# Patient Record
Sex: FEMALE | Race: BLACK OR AFRICAN AMERICAN | NOT HISPANIC OR LATINO | Employment: FULL TIME | URBAN - METROPOLITAN AREA
[De-identification: names, ages, dates, MRNs, and addresses within clinical notes are randomized per-mention and may not be internally consistent; named-entity substitution may affect disease eponyms.]

---

## 2017-05-02 ENCOUNTER — OFFICE VISIT (OUTPATIENT)
Dept: OBSTETRICS AND GYNECOLOGY | Facility: CLINIC | Age: 37
End: 2017-05-02
Payer: MEDICAID

## 2017-05-02 VITALS
HEIGHT: 63 IN | SYSTOLIC BLOOD PRESSURE: 130 MMHG | DIASTOLIC BLOOD PRESSURE: 90 MMHG | BODY MASS INDEX: 41.29 KG/M2 | WEIGHT: 233 LBS

## 2017-05-02 DIAGNOSIS — G43.829 MENSTRUAL MIGRAINE WITHOUT STATUS MIGRAINOSUS, NOT INTRACTABLE: ICD-10-CM

## 2017-05-02 DIAGNOSIS — N89.8 VAGINAL DISCHARGE: Primary | ICD-10-CM

## 2017-05-02 DIAGNOSIS — Z01.419 NORMAL GYNECOLOGIC EXAMINATION: ICD-10-CM

## 2017-05-02 PROCEDURE — 99999 PR PBB SHADOW E&M-EST. PATIENT-LVL II: CPT | Mod: PBBFAC,,, | Performed by: OBSTETRICS & GYNECOLOGY

## 2017-05-02 PROCEDURE — 88175 CYTOPATH C/V AUTO FLUID REDO: CPT

## 2017-05-02 PROCEDURE — 99385 PREV VISIT NEW AGE 18-39: CPT | Mod: S$PBB,,, | Performed by: OBSTETRICS & GYNECOLOGY

## 2017-05-02 PROCEDURE — 87480 CANDIDA DNA DIR PROBE: CPT

## 2017-05-02 PROCEDURE — 99212 OFFICE O/P EST SF 10 MIN: CPT | Mod: PBBFAC | Performed by: OBSTETRICS & GYNECOLOGY

## 2017-05-02 PROCEDURE — 87591 N.GONORRHOEAE DNA AMP PROB: CPT

## 2017-05-02 RX ORDER — LEVONORGESTREL AND ETHINYL ESTRADIOL 90; 20 UG/1; UG/1
1 TABLET ORAL DAILY
Qty: 30 TABLET | Refills: 11 | Status: SHIPPED | OUTPATIENT
Start: 2017-05-02 | End: 2017-05-04

## 2017-05-02 NOTE — LETTER
May 12, 2017      Grupo Dawkins MD  [address] [place] [address]           [facility] - [facility]/ GYN  [address] [facility] [address]  [place] [address]  Phone: [phone]          Patient: Theresa Chambers   MR Number: [id number]   YOB: 1980   Date of Visit: 5/2/2017       Dear Dr. Grupo Dawkins:    Thank you for referring Theresa Chambers to me for evaluation. Attached you will find relevant portions of my assessment and plan of care.    If you have questions, please do not hesitate to call me. I look forward to following Theresa Chambers along with you.    Sincerely,    Jonathan Mora    Enclosure  CC:  No Recipients    If you would like to receive this communication electronically, please contact [email or web address] or [phone] to request more information on [employer] Link access.    For providers and/or their staff who would like to refer a patient to [facility], please contact us through our one-stop-shop provider referral line, Yoselyn Hahn, at [phone].    If you feel you have received this communication in error or would no longer like to receive these types of communications, please e-mail [email or web address]

## 2017-05-03 ENCOUNTER — TELEPHONE (OUTPATIENT)
Dept: OBSTETRICS AND GYNECOLOGY | Facility: HOSPITAL | Age: 37
End: 2017-05-03

## 2017-05-03 DIAGNOSIS — B96.89 BV (BACTERIAL VAGINOSIS): Primary | ICD-10-CM

## 2017-05-03 DIAGNOSIS — N76.0 BV (BACTERIAL VAGINOSIS): Primary | ICD-10-CM

## 2017-05-03 LAB
C TRACH DNA SPEC QL NAA+PROBE: NOT DETECTED
CANDIDA RRNA VAG QL PROBE: NEGATIVE
G VAGINALIS RRNA GENITAL QL PROBE: POSITIVE
N GONORRHOEA DNA SPEC QL NAA+PROBE: NOT DETECTED
T VAGINALIS RRNA GENITAL QL PROBE: NEGATIVE

## 2017-05-03 RX ORDER — METRONIDAZOLE 500 MG/1
500 TABLET ORAL EVERY 12 HOURS
Qty: 14 TABLET | Refills: 0 | Status: SHIPPED | OUTPATIENT
Start: 2017-05-03 | End: 2017-05-17

## 2017-05-04 ENCOUNTER — TELEPHONE (OUTPATIENT)
Dept: OBSTETRICS AND GYNECOLOGY | Facility: HOSPITAL | Age: 37
End: 2017-05-04

## 2017-05-04 ENCOUNTER — TELEPHONE (OUTPATIENT)
Dept: OBSTETRICS AND GYNECOLOGY | Facility: CLINIC | Age: 37
End: 2017-05-04

## 2017-05-04 DIAGNOSIS — G43.829 MENSTRUAL MIGRAINE WITHOUT STATUS MIGRAINOSUS, NOT INTRACTABLE: Primary | ICD-10-CM

## 2017-05-04 RX ORDER — LEVONORGESTREL AND ETHINYL ESTRADIOL 0.15-0.03
1 KIT ORAL DAILY
Qty: 91 TABLET | Refills: 0 | Status: SHIPPED | OUTPATIENT
Start: 2017-05-04 | End: 2018-05-04

## 2017-05-04 NOTE — TELEPHONE ENCOUNTER
----- Message from Mareneusebio Barros sent at 5/4/2017  1:14 PM CDT -----  Contact: [facility] [place]  Request an alternation on levonorgestrel-ethinyl estrad (LYBREL) 90-20 mcg per tablet. Medicaid won't cover this medication. Thanks!

## 2017-05-04 NOTE — TELEPHONE ENCOUNTER
Notes Recorded by Kendal Peña MA on 5/4/2017 at 9:12 AM  Called patient no answer, unable to leave voicemail

## 2017-05-04 NOTE — TELEPHONE ENCOUNTER
----- Message from Rubén Wood MD sent at 5/3/2017 10:07 PM CDT -----  Call pt and tell her she has BV, med has been sent to pharm.

## 2017-05-05 NOTE — PROGRESS NOTES
Subjective:       Patient ID: Theresa Chambers is a 36 y.o. female.    Chief Complaint:  Gynecologic Exam      History of Present Illness  HPI  Annual Exam-Premenopausal  Patient presents for annual exam. The patient has complaints of vaginal discharge today. The patient is sexually active. GYN screening history: last pap: was normal and patient does not recall when last pap was. The patient wears seatbelts: yes. The patient participates in regular exercise: no. Has the patient ever been transfused or tattooed?: yes. The patient reports that there is not domestic violence in her life.    Pt states HA's at time of menses; pt has tubal for contraception.                  GYN & OB History  Patient's last menstrual period was 2017 (exact date).   Date of Last Pap: 2017    OB History    Para Term  AB SAB TAB Ectopic Multiple Living   4 4       1       # Outcome Date GA Lbr Ryan/2nd Weight Sex Delivery Anes PTL Lv   4A Para 05     CS-LTranv      4B Para 05     CS-LTranv      3 Para 00     Vag-Spont      2 Para 00     Vag-Spont      1 Para                   Review of Systems  Review of Systems   Constitutional: Negative.    Respiratory: Negative.    Cardiovascular: Negative.    Gastrointestinal: Negative.    Endocrine: Negative.    Genitourinary: Negative.    Musculoskeletal: Negative.    Neurological: Positive for headaches. Negative for seizures, syncope and numbness.   Hematological: Negative.    Psychiatric/Behavioral: Negative.    Breast: negative.            Objective:    Physical Exam:   Constitutional: She is oriented to person, place, and time. She appears well-developed and well-nourished. No distress.    HENT:   Head: Normocephalic and atraumatic.     Neck: No thyromegaly present.    Cardiovascular: Normal rate.     Pulmonary/Chest: Effort normal. No respiratory distress. Right breast exhibits no inverted nipple, no mass, no nipple discharge, no skin change, no  tenderness, presence, no bleeding and no swelling. Left breast exhibits no inverted nipple, no mass, no nipple discharge, no skin change, no tenderness, presence, no bleeding and no swelling.        Abdominal: Soft. Bowel sounds are normal.     Genitourinary: Uterus normal. Pelvic exam was performed with patient supine. There is no rash, tenderness, lesion or injury on the right labia. There is no rash, tenderness, lesion or injury on the left labia. Cervix is normal. Right adnexum displays no mass, no tenderness and no fullness. Left adnexum displays no mass, no tenderness and no fullness. No erythema, tenderness, bleeding, rectocele, cystocele or unspecified prolapse of vaginal walls in the vagina. No foreign body in the vagina. No signs of injury around the vagina. Vaginal discharge found. Labial bartholins normal.          Musculoskeletal: Normal range of motion and moves all extremeties.       Neurological: She is alert and oriented to person, place, and time. No cranial nerve deficit. Coordination normal.    Skin: She is not diaphoretic.    Psychiatric: She has a normal mood and affect. Her behavior is normal. Judgment and thought content normal.          Assessment:        1. Vaginal discharge    2. Normal gynecologic examination    3. Menstrual migraine without status migrainosus, not intractable               Plan:      1.  Pap collected  2.  Rx Lybrel to see if HA's improve  3.  AFFIRM, GC/Ct collected

## 2017-05-08 ENCOUNTER — TELEPHONE (OUTPATIENT)
Dept: OBSTETRICS AND GYNECOLOGY | Facility: CLINIC | Age: 37
End: 2017-05-08

## 2017-05-08 NOTE — TELEPHONE ENCOUNTER
----- Message from Mel Glynn sent at 5/8/2017 10:22 AM CDT -----  Contact: self  Pt returned Kendal's call. Please contact her at [phone].    Thanks     Pt aware of normal pap smear results.

## 2017-07-21 ENCOUNTER — OFFICE VISIT (OUTPATIENT)
Dept: OCCUPATIONAL MEDICINE | Facility: CLINIC | Age: 37
End: 2017-07-21
Payer: OTHER MISCELLANEOUS

## 2017-07-21 VITALS — SYSTOLIC BLOOD PRESSURE: 124 MMHG | DIASTOLIC BLOOD PRESSURE: 83 MMHG | HEART RATE: 81 BPM

## 2017-07-21 DIAGNOSIS — S39.012D LUMBAR SPINE STRAIN, SUBSEQUENT ENCOUNTER: Primary | ICD-10-CM

## 2017-07-21 DIAGNOSIS — M54.50 ACUTE BILATERAL LOW BACK PAIN WITHOUT SCIATICA: ICD-10-CM

## 2017-07-21 PROBLEM — S39.012A LUMBAR SPINE STRAIN: Status: ACTIVE | Noted: 2017-06-21

## 2017-07-21 PROCEDURE — 99203 OFFICE O/P NEW LOW 30 MIN: CPT | Mod: S$GLB,,, | Performed by: NURSE PRACTITIONER

## 2017-07-21 NOTE — LETTER
[facility] [address]  [place] [address]  Phone: [phone]  Fax: [phone]    Pt Name: Theresa Chambers  Injury Date:    Employee ID:  Date of First Treatment: 07/25/2017   Company: [employer] to [employer]            Appointment Time: 08:45 AM Arrived:  9:00 AM CDT   Physician: Obinna Castle NP          Chief Complaint   Patient presents with    Work Related Injury    Back Injury       Office Treatment: Theresa was seen today for work related injury and back injury.    Diagnoses and all orders for this visit:    Lumbar spine strain, subsequent encounter    Acute bilateral low back pain without sciatica            Restrictions: No lifting/pushing/pulling more than 10 lbs, No above the shoulder/overhead work, Avoid frequent bending/lifting/twisting, Avoid climbing/kneeling/squatting       Return Appointment: 7/28/2017

## 2017-07-21 NOTE — LETTER
[facility] [address]  [place] [address]  Phone: [phone]  Fax: [phone]    Pt Name: Theresa Chambers  Injury Date:    Employee ID:  Date of First Treatment: 07/21/2017   Company: [employer] to [employer]            Appointment Time: 08:45 AM Arrived:  9:00 AM CDT   Physician: Obinna Castle NP            Office Treatment: Theresa was seen today for work related injury and back injury.    Diagnoses and all orders for this visit:    Lumbar spine strain, subsequent encounter    Acute bilateral low back pain without sciatica            Restrictions: No lifting/pushing/pulling more than 10 lbs, No above the shoulder/overhead work, Avoid frequent bending/lifting/twisting, Avoid climbing/kneeling/squatting       Return Appointment: 7/28/2017

## 2017-07-21 NOTE — PROGRESS NOTES
Subjective:       Patient ID: Theresa Chambers is a 36 y.o. female.    Chief Complaint: Work Related Injury and Back Injury    Patient presents today for f/u s/p work related lower back injury.  Currently attending PT and reports she is doing well and feeling much better.  Reports medication prescribed has been working and she is able to move more freely with home exercise and Pt.  Denies any bowel and or bladder changes.  Denies pain radiating.        Back Pain   This is a recurrent problem. The current episode started more than 1 month ago. The problem occurs intermittently. The problem has been gradually improving since onset. The pain is present in the lumbar spine. The quality of the pain is described as aching. The pain does not radiate. The pain is at a severity of 4/10. The pain is mild. The symptoms are aggravated by bending and position. Stiffness is present all day. Pertinent negatives include no abdominal pain, bladder incontinence, bowel incontinence, chest pain, dysuria, fever, headaches, leg pain, numbness, paresis, paresthesias, pelvic pain, tingling, weakness or weight loss. Risk factors include lack of exercise, obesity, recent trauma and sedentary lifestyle. She has tried NSAIDs, muscle relaxant, ice, heat and home exercises (Patient is currenting attending PT and reports she is doing much better) for the symptoms. The treatment provided moderate relief.     Review of Systems   Constitution: Negative for decreased appetite, fever, weakness, malaise/fatigue and weight loss.   HENT: Negative for congestion and headaches.    Eyes: Negative for blurred vision, double vision and visual disturbance.   Cardiovascular: Negative for chest pain, irregular heartbeat, palpitations and syncope.   Respiratory: Negative for cough and shortness of breath.    Endocrine: Negative for polydipsia, polyphagia and polyuria.   Hematologic/Lymphatic: Negative for bleeding problem. Does not bruise/bleed easily.   Skin:  Negative for itching, nail changes and rash.   Musculoskeletal: Positive for back pain and stiffness. Negative for arthritis, joint pain, muscle cramps and neck pain.        Pt states that she has some Lower back pain but more so aching from physical therapy.   Gastrointestinal: Negative for abdominal pain, bowel incontinence, constipation, diarrhea and heartburn.   Genitourinary: Negative for bladder incontinence, dysuria, flank pain, hematuria, incomplete emptying, menorrhagia, missed menses, non-menstrual bleeding and pelvic pain.   Neurological: Negative for disturbances in coordination, dizziness, numbness, paresthesias, sensory change and tingling.   Psychiatric/Behavioral: Negative for altered mental status and depression.   Allergic/Immunologic: Negative for environmental allergies and persistent infections.       Objective:      Physical Exam   Constitutional: She is oriented to person, place, and time. Vital signs are normal. She appears well-developed and well-nourished. She is active and cooperative. No distress.   HENT:   Head: Normocephalic and atraumatic.   Mouth/Throat: Mucous membranes are normal.   Eyes: EOM and lids are normal. Pupils are equal, round, and reactive to light.   Neck: Trachea normal, normal range of motion, full passive range of motion without pain and phonation normal. Neck supple.   Cardiovascular: Normal rate, regular rhythm and normal pulses.    Pulmonary/Chest: Effort normal and breath sounds normal.   Abdominal: Soft. Normal appearance and bowel sounds are normal. She exhibits no abdominal bruit and no pulsatile midline mass.   Musculoskeletal: She exhibits no edema or deformity. Tenderness: tenderness lumbar paraspinal.  no spasms appreciated.        Right hip: Normal.        Left hip: Normal.        Thoracic back: Normal.        Lumbar back: She exhibits decreased range of motion, tenderness and pain. She exhibits no swelling and no spasm.        Back:         Right upper  leg: Normal.        Left upper leg: Normal.   Neurological: She is alert and oriented to person, place, and time. She has normal strength and normal reflexes. She displays normal reflexes. No cranial nerve deficit or sensory deficit. She exhibits normal muscle tone. Coordination and gait normal.   Reflex Scores:       Patellar reflexes are 2+ on the right side and 2+ on the left side.       Achilles reflexes are 2+ on the right side and 2+ on the left side.  Skin: Skin is warm, dry and intact. She is not diaphoretic.   Psychiatric: She has a normal mood and affect. Her speech is normal. Cognition and memory are normal.   Nursing note and vitals reviewed.      Assessment:       1. Lumbar spine strain, subsequent encounter    2. Acute bilateral low back pain without sciatica        Plan:       Theresa was seen today for work related injury and back injury.    Diagnoses and all orders for this visit:    Lumbar spine strain, subsequent encounter    Acute bilateral low back pain without sciatica             Restrictions: No lifting/pushing/pulling more than 10 lbs, No above the shoulder/overhead work, Avoid frequent bending/lifting/twisting, Avoid climbing/kneeling/squatting    WORK STATUS : LIGHT DUTY  NO LIFTING, PUSHING, PULLING GREATER THAN 10 POUNDS  INCREASE YOUR ACTIVITY AS TOLERATED AS WE ARE WORKING TOWARDS FULL DUTY  CONTINUE PT AND HOME EXERCISES  MEDICATION AS NEEDED AND DIRECTED  RETURN TO THE CLINIC IN ONE WEEK

## 2017-07-21 NOTE — PATIENT INSTRUCTIONS
Understanding Lumbosacral Strain    Lumbosacral strain is a medical term for an injury that causes low back pain. The lumbosacral area (low back) is between the bottom of the ribcage and the top of the buttocks. A strain is tearing of muscles and tendons. These tears can be very small but still cause pain.  How a lumbosacral strain happens  Muscles and tendons connected to the spine can be strained in a number of ways:  · Sitting or standing in the same position for long periods of time. This can harm the low back over time. Poor posture can make low back pain more likely.  · Moving the muscles and tendons past their usual range of motion. This can cause a sudden injury. This can happen when you twist, bend over, or lift something heavy. Not using correct technique for sports or tasks like lifting can make back injury more likely.  · Accidents or falls  Lumbosacral strain can be caused by other problems, but these are less common.  Symptoms of lumbosacral strain  Symptoms may include:  · Pain in the back, often on one side  · Pain that gets worse with movement and gets better with rest  · Inability to move as freely as usual  · Swelling, slight redness, and skin warmth in the painful area  Treatment for lumbosacral strain  Low back pain often goes away by itself within several weeks. But it often comes back. Treatment focuses on reducing pain and avoiding further injury. Bed rest is usually not recommended for low back pain. Treatments may include:  · Avoiding or changing the action that caused the problem. This helps prevent injuring the tissues again.  · Prescription or over-the-counter pain medicines. These help reduce inflammation, swelling, and pain.  · Cold or heat packs. These help reduce pain and swelling.  · Stretching and other exercises. These improve flexibility and strength.  · Physical therapy. This usually includes exercises and other treatments.  · Injections of medicine. This may relieve  symptoms.  If these treatments do not relieve symptoms, your healthcare provider may order imaging tests to learn more about the problem. Sometimes you may need surgery.  Possible complications of lumbosacral strain  If the cause of the pain is not addressed, symptoms may return or get worse. Follow your healthcare providers instructions on lifestyle changes and treating your back.     When to call your healthcare provider  Call your healthcare provider right away if you have any of these:  · Fever of 100.4°F (38°C) or higher, or as directed  · Numbness, tingling, or weakness  · Problems with bowel or bladder control, or problems having sex  · Pain that does not go away, or gets worse  · New symptoms   Date Last Reviewed: 3/10/2016  © 4559-6769 [employer]. [address] [place] [address], [place] [id number]. All rights reserved. This information is not intended as a substitute for professional medical care. Always follow your healthcare professional's instructions.      Restrictions: No lifting/pushing/pulling more than 10 lbs, No above the shoulder/overhead work, Avoid frequent bending/lifting/twisting, Avoid climbing/kneeling/squatting    WORK STATUS : LIGHT DUTY  NO LIFTING, PUSHING, PULLING GREATER THAN 10 POUNDS  INCREASE YOUR ACTIVITY AS TOLERATED AS WE ARE WORKING TOWARDS FULL DUTY  CONTINUE PT AND HOME EXERCISES  MEDICATION AS NEEDED AND DIRECTED  RETURN TO THE CLINIC IN ONE WEEK

## 2017-07-25 NOTE — ADDENDUM NOTE
Encounter addended by: Daniella Burrell MA on: 7/25/2017 10:33 AM<BR>    Actions taken: Letter status changed

## 2017-07-28 ENCOUNTER — OFFICE VISIT (OUTPATIENT)
Dept: OCCUPATIONAL MEDICINE | Facility: CLINIC | Age: 37
End: 2017-07-28
Payer: OTHER MISCELLANEOUS

## 2017-07-28 DIAGNOSIS — S39.012D LUMBAR SPINE STRAIN, SUBSEQUENT ENCOUNTER: Primary | ICD-10-CM

## 2017-07-28 PROCEDURE — 99213 OFFICE O/P EST LOW 20 MIN: CPT | Mod: S$GLB,,, | Performed by: NURSE PRACTITIONER

## 2017-07-28 NOTE — PROGRESS NOTES
Subjective:       Patient ID: Theresa Chambers is a 36 y.o. female.    Chief Complaint: Work Related Injury; Back Pain; and Back Injury    Back Pain   This is a new problem. The current episode started in the past 7 days. The problem occurs constantly. The problem has been gradually improving since onset. The pain is present in the lumbar spine. The quality of the pain is described as aching. The pain does not radiate. The pain is at a severity of 3/10. The pain is mild. The pain is the same all the time. The symptoms are aggravated by bending, twisting, position and standing. Stiffness is present all day. Pertinent negatives include no abdominal pain, bladder incontinence, bowel incontinence, chest pain, fever, headaches, leg pain, numbness, paresis, tingling or weakness. Risk factors include sedentary lifestyle, lack of exercise and recent trauma. She has tried NSAIDs, ice, muscle relaxant, heat and home exercises for the symptoms. The treatment provided moderate relief.     Review of Systems   Constitution: Negative for decreased appetite, fever and weakness.   HENT: Negative for congestion and headaches.    Eyes: Negative for blurred vision and pain.   Cardiovascular: Negative for chest pain, dyspnea on exertion, leg swelling and palpitations.   Respiratory: Negative for cough and shortness of breath.    Endocrine: Negative for polydipsia, polyphagia and polyuria.   Skin: Negative for dry skin, itching and suspicious lesions.   Musculoskeletal: Positive for back pain and stiffness. Negative for arthritis, joint pain and muscle cramps.        Pt states that she is having lower back pain. Due to work related injury.   Gastrointestinal: Negative for abdominal pain, bowel incontinence, constipation and nausea.   Genitourinary: Negative for bladder incontinence and frequency.   Neurological: Negative for aphonia, dizziness, numbness and tingling.   Psychiatric/Behavioral: Negative for depression. The patient is not  nervous/anxious.    Allergic/Immunologic: Negative for environmental allergies.       Objective:      Physical Exam   Constitutional: She is oriented to person, place, and time. She appears well-developed and well-nourished.   HENT:   Head: Atraumatic.   Neck: Normal range of motion. Neck supple.   Cardiovascular: Normal rate and regular rhythm.    Pulmonary/Chest: Effort normal and breath sounds normal.   Abdominal: Soft. Bowel sounds are normal.   Musculoskeletal:        Right hip: Normal.        Left hip: Normal.        Thoracic back: Normal.        Lumbar back: She exhibits decreased range of motion (DECREASED FLEXION AND EXTENSION. LATERAL AND ROTATION INCREASED COMPARED TO LAST VISIT), tenderness and pain. She exhibits no swelling and no spasm.        Right upper leg: Normal.        Left upper leg: Normal.   Neurological: She is alert and oriented to person, place, and time. She has normal reflexes.   Skin: Skin is warm and dry.   Psychiatric: She has a normal mood and affect. Her behavior is normal.       Assessment:       1. Lumbar spine strain, subsequent encounter        Plan:            Patient Instructions: Daily home exercises/warm soaks, Begin Physical Therapy   Restrictions: Avoid frequent bending/lifting/twisting, No lifting/pushing/pulling more than 10 lbs, No above the shoulder/overhead work, Sit or stand as needed, No Prolonged standing/walking   WORK STATUS: LIGHT DUTY  CONTINUE PT AND DO HOME EXERCISES DAILY  ALTERNATE ICE AND HEAT  WARM SOAKS  MASSAGE  AVOID STRENUOUS ACTIVITY  CALL WITH ANY QUESTIONS AND OR CONCERNS  MEDICATION AS DIRECTED AND NEEDED

## 2017-07-28 NOTE — ADDENDUM NOTE
Encounter addended by: Daniella Burrell MA on: 7/28/2017 12:47 PM<BR>    Actions taken: Letter status changed

## 2017-07-28 NOTE — LETTER
[facility] [address]  [place] [address]  Phone: [phone]  Fax: [phone]    Pt Name: Theresa Chambers  Injury Date: 06/09/2017   Employee ID:  Date of First Treatment: 07/28/2017   Company: [employer] reference to record [employer]          Appointment Time: 08:30 AM Arrived:  8:45 AM CDT   Physician: Obinna Castle NP          Chief Complaint   Patient presents with    Work Related Injury    Back Pain    Back Injury     Office Treatment: Theresa was seen today for work related injury, back pain and back injury.    Diagnoses and all orders for this visit:    Lumbar spine strain, subsequent encounter       Patient Instructions: Daily home exercises/warm soaks, Begin Physical Therapy    Restrictions: Avoid frequent bending/lifting/twisting, No lifting/pushing/pulling more than 10 lbs, No above the shoulder/overhead work, Sit or stand as needed, No Prolonged standing/walking   WORK STATUS: LIGHT DUTY  CONTINUE PT AND DO HOME EXERCISES DAILY  ALTERNATE ICE AND HEAT  WARM SOAKS  MASSAGE  AVOID STRENUOUS ACTIVITY  CALL WITH ANY QUESTIONS AND OR CONCERNS  MEDICATION AS DIRECTED AND NEEDED    Return Appointment: 8/11/2017

## 2017-07-28 NOTE — PATIENT INSTRUCTIONS
Back Sprain or Strain    Injury to the muscles (strain) or ligaments (sprain) around the spine can be troubling. Injury may occur after a sudden forceful twisting or bending force such as in a car accident, after a simple awkward movement, or after lifting something heavy with poor body positioning. In any case, muscle spasm is often present and adds to the pain.  Thankfully, most people feel better in 1 to 2 weeks, and most of the rest in 1 to 2 months. Most people can remain active. Unless you had a forceful or traumatic physical injury such as a car accident or fall, X-rays may not be ordered for the first evaluation of a back sprain or strain. If pain continues and does not respond to medical treatment, your healthcare provider may then order X-rays and other tests.  Home care  The following guidelines will help you care for your injury at home:  · When in bed, try to find a comfortable position. A firm mattress is best. Try lying flat on your back with pillows under your knees. You can also try lying on your side with your knees bent up toward your chest and a pillow between your knees.  · Don't sit for long periods. Try not to take long car rides or take other trips that have you sitting for a long time. This puts more stress on the lower back than standing or walking.  · During the first 24 to 72 hours after an injury or flare-up, apply an ice pack to the painful area for 20 minutes. Then remove it for 20 minutes. Do this for 60 to 90 minutes, or several times a day. This will reduce swelling and pain. Be sure to wrap the ice pack in a thin towel or plastic to protect your skin.  · You can start with ice, then switch to heat. Heat from a hot shower, hot bath, or heating pad reduces pain and works well for muscle spasms. Put heat on the painful area for 20 minutes, then remove for 20 minutes. Do this for 60 to 90 minutes, or several times a day. Do not use a heating pad while sleeping. It can burn the  skin.  · You can alternate the ice and heat. Talk with your healthcare provider to find out the best treatment or therapy for your back pain.  · Therapeutic massage will help relax the back muscles without stretching them.  · Be aware of safe lifting methods. Do not lift anything over 15 pounds until all of the pain is gone.  Medicines  Talk to your healthcare provider before using medicines, especially if you have other health problems or are taking other medicines.  · You may use acetaminophen or ibuprofen to control pain, unless another pain medicine was prescribed. If you have chronic conditions like diabetes, liver or kidney disease, stomach ulcers, or gastrointestinal bleeding, or are taking blood-thinner medicines, talk with your doctor before taking any medicines.  · Be careful if you are given prescription medicines, narcotics, or medicine for muscle spasm. They can cause drowsiness, and affect your coordination, reflexes, and judgment. Do not drive or operate heavy machinery when taking these types of medicines. Only take pain medicine as prescribed by your healthcare provider.  Follow-up care  Follow up with your healthcare provider, or as advised. You may need physical therapy or more tests if your symptoms get worse.  If you had X-rays your healthcare provider may be checking for any broken bones, breaks, or fractures. Bruises and sprains can sometimes hurt as much as a fracture. These injuries can take time to heal completely. If your symptoms dont improve or they get worse, talk with your healthcare provider. You may need a repeat X-ray or other tests.  Call 911  Call for emergency care if any of the following occur:  · Trouble breathing  · Confused  · Very drowsy or trouble awakening  · Fainting or loss of consciousness  · Rapid or very slow heart rate  · Loss of bowel or bladder control  When to seek medical advice  Call your healthcare provider right away if any of the following occur:  · Pain  gets worse or spreads to your arms or legs  · Weakness or numbness in one or both arms or legs  · Numbness in the groin or genital area  Date Last Reviewed: 6/1/2016 © 2000-2016 [employer]. [address] [place] [address]. All rights reserved. This information is not intended as a substitute for professional medical care. Always follow your healthcare professional's instructions.        Lumbar Extension (Flexibility)    1. Lie face down on your stomach, forehead on the floor. You can lie on a mat or towel.  2. Bend your arms next to your body and lift your upper body up on your forearms. Your palms and forearms should be flat on the floor. Keep your stomach and hips on the floor.  3. Hold your upper body up with your forearms for 20 seconds. Slowly lower back down to the floor.  4. Repeat 2 times, or as instructed.  Date Last Reviewed: 3/10/2016  © 9349-1069 [employer]. [address] [place] [address]. All rights reserved. This information is not intended as a substitute for professional medical care. Always follow your healthcare professional's instructions.        Lumbar Flexion (Flexibility)    5. Lie on your back on the floor, with your knees bent and your feet flat on the floor.  6. Gently pull your knees up toward your chest. Put your hands under your thighs to help pull your knees up.  7. Press your lower back down to the floor. Hold for 20 seconds.  8. Lower your legs back down to the floor and relax.  9. Repeat 2 times, or as instructed.  Date Last Reviewed: 3/10/2016  © 3971-9513 [employer]. [address] [place] [address]. All rights reserved. This information is not intended as a substitute for professional medical care. Always follow your healthcare professional's instructions.      Patient Instructions: Daily home exercises/warm soaks, Begin Physical Therapy   Restrictions: Avoid frequent bending/lifting/twisting, No  lifting/pushing/pulling more than 10 lbs, No above the shoulder/overhead work, Sit or stand as needed, No Prolonged standing/walking   WORK STATUS: LIGHT DUTY  CONTINUE PT AND DO HOME EXERCISES DAILY  ALTERNATE ICE AND HEAT  WARM SOAKS  MASSAGE  AVOID STRENUOUS ACTIVITY  CALL WITH ANY QUESTIONS AND OR CONCERNS  MEDICATION AS DIRECTED AND NEEDED

## 2017-08-11 ENCOUNTER — OFFICE VISIT (OUTPATIENT)
Dept: OCCUPATIONAL MEDICINE | Facility: CLINIC | Age: 37
End: 2017-08-11
Payer: OTHER MISCELLANEOUS

## 2017-08-11 VITALS — HEART RATE: 82 BPM | SYSTOLIC BLOOD PRESSURE: 118 MMHG | DIASTOLIC BLOOD PRESSURE: 78 MMHG

## 2017-08-11 DIAGNOSIS — S39.012D LUMBAR STRAIN, SUBSEQUENT ENCOUNTER: Primary | ICD-10-CM

## 2017-08-11 PROCEDURE — 3008F BODY MASS INDEX DOCD: CPT | Mod: S$GLB,,, | Performed by: NURSE PRACTITIONER

## 2017-08-11 PROCEDURE — 99214 OFFICE O/P EST MOD 30 MIN: CPT | Mod: S$GLB,,, | Performed by: NURSE PRACTITIONER

## 2017-08-11 NOTE — LETTER
[facility] [address]  [place] [address]  Phone: [phone]  Fax: [phone]    Pt Name: Theresa Chambers  Injury Date: 6/9/17   Employee ID:  Date of First Treatment: 08/11/2017   Company: [id number]            Appointment Time: 08:45 AM Arrived:  9:00 AM CDT   Physician: Obinna Castle NP          Office Treatment: Theresa was seen today for work related injury and back pain.    Diagnoses and all orders for this visit:    Lumbar strain, subsequent encounter       Patient Instructions: Attention not to aggravate affected area, Continue Physical Therapy (Continue home exercises for your lower back.  Medications as needed.  )    Restrictions: Avoid frequent bending/lifting/twisting, No lifting/pushing/pulling more than 25 lbs, No above the shoulder/overhead work, Sit or stand as needed (WORK STATUS: LIGHT DUTY AS OUTLINED.  RECOM CONTINUING PT)       Return Appointment: Visit date not found

## 2017-08-11 NOTE — PROGRESS NOTES
Subjective:       Patient ID: Theresa Chambers is a 36 y.o. female.    Chief Complaint: Work Related Injury and Back Pain    Back Pain   This is a recurrent problem. The current episode started more than 1 month ago. The problem occurs intermittently. The problem has been gradually improving since onset. The pain is present in the lumbar spine. The pain does not radiate. The pain is at a severity of 0/10. The patient is experiencing no pain. The symptoms are aggravated by twisting and bending. Pertinent negatives include no abdominal pain, bladder incontinence, bowel incontinence, dysuria, leg pain, numbness or tingling. Risk factors include recent trauma, obesity and sedentary lifestyle. She has tried muscle relaxant, ice, NSAIDs and home exercises (currently in pt) for the symptoms. The treatment provided significant relief.     Review of Systems   Constitution: Negative for malaise/fatigue.   Skin: Negative for rash.   Musculoskeletal: Positive for back pain. Negative for muscle cramps, muscle weakness and stiffness.        Pt states she is still having back pain that is getting better with physical therapy.   Gastrointestinal: Negative for abdominal pain and bowel incontinence.   Genitourinary: Negative for bladder incontinence, dysuria, hematuria and urgency.   Neurological: Negative for disturbances in coordination, numbness and tingling.       Objective:      Physical Exam    Assessment:       1. Lumbar strain, subsequent encounter        Plan:                   ***

## 2017-08-11 NOTE — PROGRESS NOTES
Subjective:       Patient ID: Theresa Chambers is a 36 y.o. female.    Chief Complaint: Work Related Injury and Back Pain    Back Pain   This is a recurrent problem. The current episode started more than 1 month ago. The problem occurs intermittently. The problem has been gradually improving since onset. The pain is present in the lumbar spine. The pain does not radiate. The pain is at a severity of 0/10. The patient is experiencing no pain and reports she is 80 percent better and feels PT has help tremendously.  The symptoms are occassionally aggravated by twisting and bending. Pertinent negatives include no abdominal pain, bladder incontinence, bowel incontinence, dysuria, leg pain, numbness or tingling. Risk factors include recent back trauma/injury , obesity and sedentary lifestyle. She has tried muscle relaxant, ice, NSAIDs and home exercises (currently in pt) for the symptoms. The treatment provided significant relief. Pt states is still having intermittent lower back pain but pain is much better with physical therapy. Patient reports she is moving to [place] tomorrow and will continue treatment in [place].      Review of Systems   Constitution: Negative for chills, decreased appetite, diaphoresis and fever.   HENT: Negative for congestion, ear pain and headaches.    Eyes: Negative for blurred vision, double vision and pain.   Cardiovascular: Negative for chest pain and cyanosis.   Respiratory: Negative for cough and shortness of breath.    Endocrine: Negative for cold intolerance and polydipsia.   Hematologic/Lymphatic: Negative for adenopathy.   Skin: Negative for dry skin and nail changes.   Musculoskeletal: Positive for back pain (lower back pain.  reports 80 percent better). Negative for arthritis, falls, joint pain, joint swelling, muscle cramps, muscle weakness, neck pain and stiffness.        Pt states she is still having lower back pain with improvement due to physical therapy.    Gastrointestinal:  Negative for bloating, change in bowel habit, bowel incontinence, nausea and vomiting.   Genitourinary: Negative for bladder incontinence, flank pain and missed menses.   Neurological: Negative for aphonia, disturbances in coordination and sensory change.   Psychiatric/Behavioral: Negative for depression. The patient is nervous/anxious.    Allergic/Immunologic: Negative for environmental allergies.       Objective:      Physical Exam   Constitutional: She is oriented to person, place, and time. She appears well-developed and well-nourished.   Overweight female   Eyes: Conjunctivae and EOM are normal. Pupils are equal, round, and reactive to light.   Neck: Normal range of motion. Neck supple.   Cardiovascular: Normal rate, regular rhythm and intact distal pulses.    Pulmonary/Chest: Effort normal.   Abdominal: Soft. Bowel sounds are normal.   Musculoskeletal:        Right hip: Normal.        Left hip: Normal.        Right knee: Normal.        Left knee: Normal.        Thoracic back: Normal.        Lumbar back: She exhibits decreased range of motion (flexion and extension still decreased but much better compared to last visit) and tenderness (mild paraspinal lumbar ttp). She exhibits no bony tenderness, no swelling, no edema, no pain and no spasm.        Back:         Right upper leg: Normal.        Left upper leg: Normal.   Neurological: She is oriented to person, place, and time. She has normal strength and normal reflexes. No cranial nerve deficit or sensory deficit.   Reflex Scores:       Patellar reflexes are 2+ on the right side and 2+ on the left side.  Skin: Skin is warm, dry and intact.   Psychiatric: She has a normal mood and affect.       Assessment:       1. Lumbar strain, subsequent encounter        Plan:       Theresa was seen today for work related injury and back pain.    Diagnoses and all orders for this visit:    Lumbar strain, subsequent encounter         Patient Instructions: Attention not to  aggravate affected area, Continue Physical Therapy (Continue home exercises for your lower back.  Medications as needed.  )   Restrictions: Avoid frequent bending/lifting/twisting, No lifting/pushing/pulling more than 25 lbs, No above the shoulder/overhead work, Sit or stand as needed (WORK STATUS: LIGHT DUTY AS OUTLINED.  RECOM CONTINUING PT)

## 2017-08-11 NOTE — PATIENT INSTRUCTIONS
Back Sprain or Strain    Injury to the muscles (strain) or ligaments (sprain) around the spine can be troubling. Injury may occur after a sudden forceful twisting or bending force such as in a car accident, after a simple awkward movement, or after lifting something heavy with poor body positioning. In any case, muscle spasm is often present and adds to the pain.  Thankfully, most people feel better in 1 to 2 weeks, and most of the rest in 1 to 2 months. Most people can remain active. Unless you had a forceful or traumatic physical injury such as a car accident or fall, X-rays may not be ordered for the first evaluation of a back sprain or strain. If pain continues and does not respond to medical treatment, your healthcare provider may then order X-rays and other tests.  Home care  The following guidelines will help you care for your injury at home:  · When in bed, try to find a comfortable position. A firm mattress is best. Try lying flat on your back with pillows under your knees. You can also try lying on your side with your knees bent up toward your chest and a pillow between your knees.  · Don't sit for long periods. Try not to take long car rides or take other trips that have you sitting for a long time. This puts more stress on the lower back than standing or walking.  · During the first 24 to 72 hours after an injury or flare-up, apply an ice pack to the painful area for 20 minutes. Then remove it for 20 minutes. Do this for 60 to 90 minutes, or several times a day. This will reduce swelling and pain. Be sure to wrap the ice pack in a thin towel or plastic to protect your skin.  · You can start with ice, then switch to heat. Heat from a hot shower, hot bath, or heating pad reduces pain and works well for muscle spasms. Put heat on the painful area for 20 minutes, then remove for 20 minutes. Do this for 60 to 90 minutes, or several times a day. Do not use a heating pad while sleeping. It can burn the  skin.  · You can alternate the ice and heat. Talk with your healthcare provider to find out the best treatment or therapy for your back pain.  · Therapeutic massage will help relax the back muscles without stretching them.  · Be aware of safe lifting methods. Do not lift anything over 15 pounds until all of the pain is gone.  Medicines  Talk to your healthcare provider before using medicines, especially if you have other health problems or are taking other medicines.  · You may use acetaminophen or ibuprofen to control pain, unless another pain medicine was prescribed. If you have chronic conditions like diabetes, liver or kidney disease, stomach ulcers, or gastrointestinal bleeding, or are taking blood-thinner medicines, talk with your doctor before taking any medicines.  · Be careful if you are given prescription medicines, narcotics, or medicine for muscle spasm. They can cause drowsiness, and affect your coordination, reflexes, and judgment. Do not drive or operate heavy machinery when taking these types of medicines. Only take pain medicine as prescribed by your healthcare provider.  Follow-up care  Follow up with your healthcare provider, or as advised. You may need physical therapy or more tests if your symptoms get worse.  If you had X-rays your healthcare provider may be checking for any broken bones, breaks, or fractures. Bruises and sprains can sometimes hurt as much as a fracture. These injuries can take time to heal completely. If your symptoms dont improve or they get worse, talk with your healthcare provider. You may need a repeat X-ray or other tests.  Call 911  Call for emergency care if any of the following occur:  · Trouble breathing  · Confused  · Very drowsy or trouble awakening  · Fainting or loss of consciousness  · Rapid or very slow heart rate  · Loss of bowel or bladder control  When to seek medical advice  Call your healthcare provider right away if any of the following occur:  · Pain  gets worse or spreads to your arms or legs  · Weakness or numbness in one or both arms or legs  · Numbness in the groin or genital area  Date Last Reviewed: 6/1/2016 © 2000-2016 [employer]. [address] [place] [address]. All rights reserved. This information is not intended as a substitute for professional medical care. Always follow your healthcare professional's instructions.        Lumbar Flexion (Flexibility)    1. Lie on your back on the floor, with your knees bent and your feet flat on the floor.  2. Gently pull your knees up toward your chest. Put your hands under your thighs to help pull your knees up.  3. Press your lower back down to the floor. Hold for 20 seconds.  4. Lower your legs back down to the floor and relax.  5. Repeat 2 times, or as instructed.  Date Last Reviewed: 3/10/2016  © 7644-3516 [employer]. [address] [place] [address]. All rights reserved. This information is not intended as a substitute for professional medical care. Always follow your healthcare professional's instructions.        Lumbar Extension (Flexibility)    6. Lie face down on your stomach, forehead on the floor. You can lie on a mat or towel.  7. Bend your arms next to your body and lift your upper body up on your forearms. Your palms and forearms should be flat on the floor. Keep your stomach and hips on the floor.  8. Hold your upper body up with your forearms for 20 seconds. Slowly lower back down to the floor.  9. Repeat 2 times, or as instructed.  Date Last Reviewed: 3/10/2016  © 9431-0712 [employer]. [address] [place] [address]. All rights reserved. This information is not intended as a substitute for professional medical care. Always follow your healthcare professional's instructions.        Understanding Lumbosacral Strain    Lumbosacral strain is a medical term for an injury that causes low back pain. The lumbosacral area (low back)  is between the bottom of the ribcage and the top of the buttocks. A strain is tearing of muscles and tendons. These tears can be very small but still cause pain.  How a lumbosacral strain happens  Muscles and tendons connected to the spine can be strained in a number of ways:  · Sitting or standing in the same position for long periods of time. This can harm the low back over time. Poor posture can make low back pain more likely.  · Moving the muscles and tendons past their usual range of motion. This can cause a sudden injury. This can happen when you twist, bend over, or lift something heavy. Not using correct technique for sports or tasks like lifting can make back injury more likely.  · Accidents or falls  Lumbosacral strain can be caused by other problems, but these are less common.  Symptoms of lumbosacral strain  Symptoms may include:  · Pain in the back, often on one side  · Pain that gets worse with movement and gets better with rest  · Inability to move as freely as usual  · Swelling, slight redness, and skin warmth in the painful area  Treatment for lumbosacral strain  Low back pain often goes away by itself within several weeks. But it often comes back. Treatment focuses on reducing pain and avoiding further injury. Bed rest is usually not recommended for low back pain. Treatments may include:  · Avoiding or changing the action that caused the problem. This helps prevent injuring the tissues again.  · Prescription or over-the-counter pain medicines. These help reduce inflammation, swelling, and pain.  · Cold or heat packs. These help reduce pain and swelling.  · Stretching and other exercises. These improve flexibility and strength.  · Physical therapy. This usually includes exercises and other treatments.  · Injections of medicine. This may relieve symptoms.  If these treatments do not relieve symptoms, your healthcare provider may order imaging tests to learn more about the problem. Sometimes you may  need surgery.  Possible complications of lumbosacral strain  If the cause of the pain is not addressed, symptoms may return or get worse. Follow your healthcare providers instructions on lifestyle changes and treating your back.     When to call your healthcare provider  Call your healthcare provider right away if you have any of these:  · Fever of 100.4°F (38°C) or higher, or as directed  · Numbness, tingling, or weakness  · Problems with bowel or bladder control, or problems having sex  · Pain that does not go away, or gets worse  · New symptoms   Date Last Reviewed: 3/10/2016  © 9448-3581 [employer]. [address] [place] [address]. All rights reserved. This information is not intended as a substitute for professional medical care. Always follow your healthcare professional's instructions.      Restrictions: Avoid frequent bending/lifting/twisting, No lifting/pushing/pulling more than 25 lbs, No above the shoulder/overhead work, Sit or stand as needed (WORK STATUS: LIGHT DUTY AS OUTLINED.  RECOM CONTINUING PT)

## 2017-08-11 NOTE — ADDENDUM NOTE
Encounter addended by: Daniella Burrell MA on: 8/11/2017  4:40 PM<BR>    Actions taken: Letter status changed

## 2017-08-16 NOTE — ADDENDUM NOTE
Encounter addended by: Daniella Burrell MA on: 8/16/2017  1:01 PM<BR>    Actions taken: Letter status changed

## 2017-09-01 ENCOUNTER — OFFICE VISIT (OUTPATIENT)
Dept: OCCUPATIONAL MEDICINE | Facility: CLINIC | Age: 37
End: 2017-09-01
Payer: OTHER MISCELLANEOUS

## 2017-09-01 VITALS — DIASTOLIC BLOOD PRESSURE: 86 MMHG | SYSTOLIC BLOOD PRESSURE: 134 MMHG | HEART RATE: 88 BPM

## 2017-09-01 DIAGNOSIS — S39.012D LUMBAR SPINE STRAIN, SUBSEQUENT ENCOUNTER: Primary | ICD-10-CM

## 2017-09-01 PROBLEM — S39.012A LUMBAR SPINE STRAIN: Status: RESOLVED | Noted: 2017-06-21 | Resolved: 2017-09-01

## 2017-09-01 PROCEDURE — 99214 OFFICE O/P EST MOD 30 MIN: CPT | Mod: S$GLB,,, | Performed by: PHYSICIAN ASSISTANT

## 2017-09-01 PROCEDURE — 3008F BODY MASS INDEX DOCD: CPT | Mod: S$GLB,,, | Performed by: PHYSICIAN ASSISTANT

## 2017-09-01 NOTE — PROGRESS NOTES
Subjective:       Patient ID: Theresa Chambers is a 37 y.o. female.    Chief Complaint: Work Related Injury and Back Pain    PT PRESENTS FOR F/U LBP. SHE REPORTS MUCH IMPROVED AND ONLY HAS LBP WITH PROLONGED STANDING. SHE IS CURRENTLY PAIN FREE. SHE WAS SUPPOSED TO CONTINUE PHYSICAL THERAPY, BUT MOVED TO [place] TEMPORARILY AND IS NOW BACK IN [facility] FOR GOOD. MB      Back Pain   The current episode started more than 1 month ago. The problem has been gradually improving since onset. The pain is present in the lumbar spine. The quality of the pain is described as aching (Achy throbbing pain in lower back). The pain is at a severity of 4/10. The pain is mild. The pain is worse during the day. The symptoms are aggravated by standing (Pt states that standing for long periods of time is when she feels the most pain). Pertinent negatives include no abdominal pain, bladder incontinence, bowel incontinence, chest pain, fever, headaches, numbness or paresthesias. She has tried NSAIDs and ice for the symptoms. The treatment provided mild relief.     Review of Systems   Constitution: Negative for chills and fever.   HENT: Negative for congestion, hearing loss, nosebleeds and sore throat.    Eyes: Negative for blurred vision, pain and redness.   Cardiovascular: Negative for chest pain, leg swelling and syncope.   Respiratory: Positive for cough. Negative for shortness of breath.    Endocrine: Negative for cold intolerance and heat intolerance.   Skin: Negative for dry skin, itching and rash.   Musculoskeletal: Positive for back pain. Negative for joint pain.        Pt states that she is still having lower back pain due to work related injury on 6/9/17. 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   Gastrointestinal: Negative for bloating, abdominal pain, bowel incontinence and heartburn.   Genitourinary: Negative for bladder incontinence.   Neurological: Negative for headaches, numbness and  paresthesias.   Psychiatric/Behavioral: Negative for depression. The patient is not nervous/anxious.        Objective:      Physical Exam   Constitutional: She appears well-developed and well-nourished. She is active. No distress.   HENT:   Head: Normocephalic and atraumatic.   Right Ear: Hearing and external ear normal.   Left Ear: Hearing and external ear normal.   Nose: Nose normal. No nasal deformity. No epistaxis.   Mouth/Throat: Oropharynx is clear and moist and mucous membranes are normal.   Eyes: Conjunctivae and lids are normal. Right conjunctiva is not injected. Left conjunctiva is not injected.   Neck: Trachea normal and normal range of motion. No spinous process tenderness and no muscular tenderness present.   Cardiovascular: Intact distal pulses and normal pulses.    Pulses:       Dorsalis pedis pulses are 2+ on the right side, and 2+ on the left side.        Posterior tibial pulses are 2+ on the right side, and 2+ on the left side.   Pulmonary/Chest: Effort normal. No stridor. No respiratory distress.   Abdominal: Normal appearance.   Musculoskeletal:        Cervical back: Normal.        Thoracic back: Normal.        Lumbar back: She exhibits normal range of motion, no tenderness, no deformity and normal pulse.   Neurological: She is alert. She has normal strength and normal reflexes. No sensory deficit. GCS eye subscore is 4. GCS verbal subscore is 5. GCS motor subscore is 6.   Reflex Scores:       Patellar reflexes are 2+ on the right side and 2+ on the left side.       Achilles reflexes are 2+ on the right side and 2+ on the left side.  SLR negative bilaterally.    Skin: Skin is warm, dry and intact. No abrasion and no bruising noted.   Psychiatric: She has a normal mood and affect. Her speech is normal and behavior is normal. Thought content normal. Cognition and memory are normal. She is attentive.   Nursing note and vitals reviewed.      Assessment:       1. Lumbar spine strain, subsequent  encounter        Plan:       Continue Aleve as needed for pain.      Patient Instructions: Daily home exercises/warm soaks, Discontinue Physical Therapy   Restrictions: Regular Duty, Discharged from Occupational Health

## 2017-09-01 NOTE — LETTER
[facility] [address]  [place] [address]  Phone: [phone]  Fax: [phone]    Pt Name: Theresa Chambers  Injury Date:    Employee ID:  Date of First Treatment: 09/01/2017   Company: [employer]            Appointment Time: 10:00 AM Arrived: 10:15 AM CDT   Physician: Beto Barros PA-C            Office Treatment: Theresa was seen today for work related injury and back pain.    Diagnoses and all orders for this visit:    Lumbar spine strain, subsequent encounter       Patient Instructions: Daily home exercises/warm soaks, Discontinue Physical Therapy    Restrictions: Regular Duty, Discharged from Occupational Health       Return Appointment: Visit date not found

## 2017-09-01 NOTE — LETTER
[facility] [address]  [place] [address]  Phone: [phone]  Fax: [phone]    Pt Name: Theresa Chambers  Injury Date:    Employee ID:  Date of First Treatment: 09/01/2017   Company: [employer]            Appointment Time: 10:00 AM Arrived: 10:15 AM CDT   Physician: Beto Barros PA-C            Office Treatment: Theresa was seen today for work related injury and back pain.    Diagnoses and all orders for this visit:    Lumbar spine strain, subsequent encounter       Patient Instructions: Daily home exercises/warm soaks, Discontinue Physical Therapy    Restrictions: Regular Duty, Discharged from Occupational Health

## 2018-01-18 ENCOUNTER — OFFICE VISIT (OUTPATIENT)
Dept: OCCUPATIONAL MEDICINE | Facility: CLINIC | Age: 38
End: 2018-01-18
Payer: OTHER MISCELLANEOUS

## 2018-01-18 VITALS — DIASTOLIC BLOOD PRESSURE: 76 MMHG | HEART RATE: 82 BPM | SYSTOLIC BLOOD PRESSURE: 151 MMHG

## 2018-01-18 DIAGNOSIS — M62.830 MUSCLE SPASM OF BACK: ICD-10-CM

## 2018-01-18 DIAGNOSIS — M54.41 ACUTE BILATERAL LOW BACK PAIN WITH BILATERAL SCIATICA: Primary | ICD-10-CM

## 2018-01-18 DIAGNOSIS — S39.012A LUMBAR STRAIN, INITIAL ENCOUNTER: ICD-10-CM

## 2018-01-18 DIAGNOSIS — M54.42 ACUTE BILATERAL LOW BACK PAIN WITH BILATERAL SCIATICA: Primary | ICD-10-CM

## 2018-01-18 PROCEDURE — 99214 OFFICE O/P EST MOD 30 MIN: CPT | Mod: S$GLB,,, | Performed by: NURSE PRACTITIONER

## 2018-01-18 RX ORDER — ETODOLAC 400 MG/1
400 TABLET, FILM COATED ORAL 2 TIMES DAILY
Qty: 30 TABLET | Refills: 0 | Status: SHIPPED | OUTPATIENT
Start: 2018-01-18 | End: 2018-02-19 | Stop reason: SDUPTHER

## 2018-01-18 RX ORDER — CYCLOBENZAPRINE HCL 10 MG
10 TABLET ORAL 3 TIMES DAILY PRN
Qty: 30 TABLET | Refills: 0 | Status: SHIPPED | OUTPATIENT
Start: 2018-01-18 | End: 2018-01-28

## 2018-01-18 NOTE — LETTER
[facility] [address]  [place] [address]  Phone: [phone]  Fax: [phone]    Pt Name: Theresa Chambers  Injury Date: 06/09/2017   Employee ID:  Date of First Treatment: 01/18/2018   Company: [employer]            Appointment Time: 01:25 PM Arrived:  1:40 PM CST   Appointment Date: [unfilled] Time Out:2:48pm   Physician: Irma Crespo NP        Office Treatment: Theresa was seen today for employment physical and back pain.    Diagnoses and all orders for this visit:    Acute bilateral low back pain with bilateral sciatica  -     cyclobenzaprine (FLEXERIL) 10 MG tablet; Take 1 tablet (10 mg total) by mouth 3 (three) times daily as needed for Muscle spasms.  -     etodolac (LODINE) 400 MG tablet; Take 1 tablet (400 mg total) by mouth 2 (two) times daily.    Lumbar strain, initial encounter  -     cyclobenzaprine (FLEXERIL) 10 MG tablet; Take 1 tablet (10 mg total) by mouth 3 (three) times daily as needed for Muscle spasms.  -     etodolac (LODINE) 400 MG tablet; Take 1 tablet (400 mg total) by mouth 2 (two) times daily.    Muscle spasm of back  -     cyclobenzaprine (FLEXERIL) 10 MG tablet; Take 1 tablet (10 mg total) by mouth 3 (three) times daily as needed for Muscle spasms.  -     etodolac (LODINE) 400 MG tablet; Take 1 tablet (400 mg total) by mouth 2 (two) times daily.       Patient Instructions: Attention not to aggravate affected area, Daily home exercises/warm soaks, Apply ice 24-48 hours then apply heat/warm soaks (PLEASE TAKE YOUR MEDICATIONS AS DIRECTED FOR YOUR PAIN)    Restrictions: Sit or stand as needed, Avoid frequent bending/lifting/twisting, Avoid climbing/kneeling/squatting, No lifting/pushing/pulling more than 10 lbs (LIGHT DUTY)       Return Appointment: 01/24/18 AT 10AM AT OUR [place] [facility] LOCATED AT [address] [place] [address]

## 2018-01-18 NOTE — PROGRESS NOTES
Subjective:       Patient ID: Theresa Chambers is a 37 y.o. female.    Chief Complaint: Employment Physical and Back Pain    PT PRESENTS TO THE CLINIC WITH C/O LOW BACK PAIN WHICH IS RADIATING DOWN BILAT LEGS. PT STATES THAT IT IS AN INJURY FROM June THAT OCCURRED AT WORK. PT WAS SEEN IN THE OCCUPATIONAL MEDICINE SIDE AND DID PT SHE WAS RELEASED BACK TO FULL DUTY ON 09/01/17. STATES SHE RETURNED TO WORK ON 09/05/17 AND THAT HER BACK STARTED TO HURT AGAIN IN December. PT STATES SHE HAS NOT BEEN TO WORK SINCE 12/08/17 SECONDARY TO HAVING A NERVOUS BREAKDOWN. PT WAS SEEN BY HER PCP Dr. MENDOZA ON 12/28/17 WITH THE SAME COMPLAINTS AND WAS PRESCRIBED TRAMADOL. PT STATES SHE SPOKE TO HER  WHO TOLD HER TO CALL WORKERS COMP AND TO COME BACK IN TO BE RE-TREATED. PT IS REQUESTING AN MRI AND STRONGER PAIN MEDICATION AND MUSCLE RELAXERS AS SHE STATES HER CURRENT MEDICATION IS INEFFECTIVE BECAUSE SHE IS IMMUNE TO IT.       Back Pain   This is a new problem. The current episode started more than 1 month ago. The problem occurs intermittently. The problem is unchanged. The pain is present in the lumbar spine. The quality of the pain is described as burning and shooting. The pain radiates to the left thigh and right thigh. The pain is at a severity of 10/10. The pain is severe. The pain is worse during the night. The symptoms are aggravated by standing, sitting, twisting, position, lying down and bending. Stiffness is present all day. Associated symptoms include leg pain and tingling. Pertinent negatives include no bladder incontinence, bowel incontinence, numbness, paresis, paresthesias, perianal numbness or weakness. Risk factors include obesity, sedentary lifestyle and lack of exercise. She has tried NSAIDs and heat for the symptoms. The treatment provided mild relief.     Review of Systems   Constitution: Negative for weakness.   Musculoskeletal: Positive for back pain and stiffness.        Pt states that she is still  having back pain and some numbness and tingling in both legs. Pt states that her Left foot swells.   Gastrointestinal: Negative for bowel incontinence.   Genitourinary: Negative for bladder incontinence.   Neurological: Positive for tingling. Negative for numbness and paresthesias.   All other systems reviewed and are negative.      Objective:      Physical Exam   Constitutional: She is oriented to person, place, and time. She appears well-developed and well-nourished.  Non-toxic appearance. She does not have a sickly appearance. She does not appear ill.   Uncomfortable on exam   HENT:   Head: Normocephalic.   Eyes: Conjunctivae and EOM are normal. Pupils are equal, round, and reactive to light.   Neck: Normal range of motion. Neck supple. No spinous process tenderness and no muscular tenderness present. No neck rigidity. Normal range of motion present.   Cardiovascular: Normal rate, regular rhythm, normal heart sounds and intact distal pulses.    Pulses:       Dorsalis pedis pulses are 2+ on the right side, and 2+ on the left side.        Posterior tibial pulses are 2+ on the right side, and 2+ on the left side.   Pulmonary/Chest: Effort normal and breath sounds normal.   Musculoskeletal: She exhibits tenderness.        Right hip: Normal.        Left hip: Normal.        Cervical back: Normal.        Thoracic back: Normal.        Lumbar back: She exhibits decreased range of motion, tenderness, pain and spasm. She exhibits no bony tenderness, no swelling, no edema, no deformity, no laceration and normal pulse.        Back:         Right upper leg: Normal.        Left upper leg: Normal.   Neurological: She is alert and oriented to person, place, and time. She displays normal reflexes. No cranial nerve deficit or sensory deficit. She exhibits normal muscle tone. Coordination normal.   Reflex Scores:       Patellar reflexes are 2+ on the right side and 2+ on the left side.  Skin: Skin is warm and dry. Capillary refill  takes less than 2 seconds. No rash noted.   Psychiatric: She has a normal mood and affect.   Nursing note and vitals reviewed.      Had a detailed discussion with the pt on following up with Dr. Salgado for her ongoing back pain.   Assessment:       1. Acute bilateral low back pain with bilateral sciatica    2. Lumbar strain, initial encounter    3. Muscle spasm of back        Plan:           Medications Ordered This Encounter      cyclobenzaprine (FLEXERIL) 10 MG tablet          Sig: Take 1 tablet (10 mg total) by mouth 3 (three) times daily as needed for Muscle spasms.          Dispense:  30 tablet          Refill:  0      etodolac (LODINE) 400 MG tablet          Sig: Take 1 tablet (400 mg total) by mouth 2 (two) times daily.          Dispense:  30 tablet          Refill:  0  Patient Instructions: Attention not to aggravate affected area, Daily home exercises/warm soaks, Apply ice 24-48 hours then apply heat/warm soaks (PLEASE TAKE YOUR MEDICATIONS AS DIRECTED FOR YOUR PAIN)   Restrictions: Sit or stand as needed, Avoid frequent bending/lifting/twisting, Avoid climbing/kneeling/squatting, No lifting/pushing/pulling more than 10 lbs (LIGHT DUTY)  Follow-up in about 6 days (around 1/24/2018).

## 2018-01-24 ENCOUNTER — OFFICE VISIT (OUTPATIENT)
Dept: URGENT CARE | Facility: CLINIC | Age: 38
End: 2018-01-24
Payer: OTHER MISCELLANEOUS

## 2018-01-24 DIAGNOSIS — M62.830 MUSCLE SPASM OF BACK: ICD-10-CM

## 2018-01-24 DIAGNOSIS — S33.5XXD LUMBAR SPRAIN, SUBSEQUENT ENCOUNTER: ICD-10-CM

## 2018-01-24 DIAGNOSIS — M54.42 ACUTE BILATERAL LOW BACK PAIN WITH BILATERAL SCIATICA: Primary | ICD-10-CM

## 2018-01-24 DIAGNOSIS — M54.41 ACUTE BILATERAL LOW BACK PAIN WITH BILATERAL SCIATICA: Primary | ICD-10-CM

## 2018-01-24 PROCEDURE — 99213 OFFICE O/P EST LOW 20 MIN: CPT | Mod: 25,S$GLB,, | Performed by: PREVENTIVE MEDICINE

## 2018-01-24 PROCEDURE — 96372 THER/PROPH/DIAG INJ SC/IM: CPT | Mod: S$GLB,,, | Performed by: PREVENTIVE MEDICINE

## 2018-01-24 RX ORDER — HYDROCODONE BITARTRATE AND ACETAMINOPHEN 5; 325 MG/1; MG/1
1 TABLET ORAL EVERY 6 HOURS PRN
Qty: 30 TABLET | Refills: 0 | Status: SHIPPED | OUTPATIENT
Start: 2018-01-24 | End: 2018-02-19 | Stop reason: SDUPTHER

## 2018-01-24 RX ORDER — KETOROLAC TROMETHAMINE 30 MG/ML
60 INJECTION, SOLUTION INTRAMUSCULAR; INTRAVENOUS
Status: COMPLETED | OUTPATIENT
Start: 2018-01-24 | End: 2018-01-24

## 2018-01-24 RX ORDER — ADALIMUMAB 40MG/0.8ML
KIT SUBCUTANEOUS
Refills: 3 | COMMUNITY
Start: 2018-01-09

## 2018-01-24 RX ADMIN — KETOROLAC TROMETHAMINE 60 MG: 30 INJECTION, SOLUTION INTRAMUSCULAR; INTRAVENOUS at 12:01

## 2018-01-24 NOTE — PROGRESS NOTES
Subjective:       Patient ID: Theresa Chambers is a 37 y.o. female.    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 Pt has been treating with [facility] clinic. She had physical therapy, and was released full duty in September 2017. Pt continued with intermittent LBP and treated with her PCP under the impression that her "[employer] claim was closed. She was then informed that her claim was still in fact active. Pt has not worked since 12/8/2017 due to her back injury and a mental breakdown. She reports to [facility] following referral from Irma Crespo NP at [facility]. Currently, Pt c/o low back pain radiating to bilateral LE with numbness and tingling, LB spasm. Also, intermittent swelling to left ankle/foot. Sx are worse with prolonged standing and sitting. Pt is taking tramadol, and flexeril, uses heat and has purchased a new mattress with mild relief from back pain. Pt request MRI. AJD       Back Pain   This is a chronic problem. The current episode started more than 1 month ago. The problem occurs constantly. The problem has been waxing and waning since onset. The pain is present in the lumbar spine. The quality of the pain is described as aching and cramping. The pain radiates to the left thigh and right thigh. The pain is moderate. The symptoms are aggravated by sitting and standing. Associated symptoms include numbness and tingling. Pertinent negatives include no abdominal pain, bladder incontinence, bowel incontinence or dysuria. She has tried muscle relaxant, analgesics and heat for the symptoms. The treatment provided mild relief.     Review of Systems   Constitution: Negative for malaise/fatigue.   Skin: Negative for color change and rash.   Musculoskeletal: Positive for back pain, muscle cramps and myalgias. Negative for  muscle weakness and stiffness.   Gastrointestinal: Negative for abdominal pain and bowel incontinence.   Genitourinary: Negative for bladder incontinence, dysuria, hematuria and urgency.   Neurological: Positive for numbness and tingling. Negative for disturbances in coordination.   Psychiatric/Behavioral: Positive for depression.   All other systems reviewed and are negative.      Objective:      Physical Exam   Constitutional: She appears well-developed and well-nourished.   HENT:   Head: Normocephalic and atraumatic.   Eyes: EOM are normal. Pupils are equal, round, and reactive to light.   Neck: Normal range of motion.   Cardiovascular: Normal rate and regular rhythm.    Pulmonary/Chest: Effort normal and breath sounds normal.   Musculoskeletal:        Lumbar back: She exhibits decreased range of motion, tenderness, pain and spasm. She exhibits no bony tenderness, no swelling, no edema, no deformity, no laceration and normal pulse.        Back:    Pain with spasm about low back. Pain with flexion to 45, extension to 10 and lateral bending to 10 degrees. No focal or neurologic deficits.    Neurological: She is alert.   No focal neurologic deficits   Skin: Skin is warm.   Psychiatric: She has a normal mood and affect.   Nursing note and vitals reviewed.      Assessment:       1. Acute bilateral low back pain with bilateral sciatica    2. Muscle spasm of back    3. Lumbar sprain, subsequent encounter        Plan:           Medications Ordered This Encounter      hydrocodone-acetaminophen 5-325mg (NORCO) 5-325 mg per tablet          Sig: Take 1 tablet by mouth every 6 (six) hours as needed for Pain.          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

## 2018-01-24 NOTE — LETTER
[facility] [place]  [address], [address]  [place] [address]  Phone: [phone]  Fax: [phone]    Pt Name: Theresa Chambers  Injury Date: 06/09/2017   Employee ID: [id number] Date of First Treatment: 01/24/2018   Company: [employer]            Appointment Time: 10:00 AM Arrived: 8:53 AM CST   Physician: Kobe Salgado MD Time out: 10:32 AM       Office Treatment: Theresa was seen today for back pain.    Diagnoses and all orders for this visit:    Acute bilateral low back pain with bilateral sciatica  Muscle spasm of back  Lumbar sprain, subsequent encounter  Other orders  -     hydrocodone-acetaminophen 5-325mg (NORCO) 5-325 mg per tablet; Take 1 tablet by mouth every 6 (six) hours as needed for Pain.     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       Return Appointment: 1/31/2018 @11:00 AM

## 2018-01-31 ENCOUNTER — OFFICE VISIT (OUTPATIENT)
Dept: URGENT CARE | Facility: CLINIC | Age: 38
End: 2018-01-31
Payer: OTHER MISCELLANEOUS

## 2018-01-31 DIAGNOSIS — M54.41 ACUTE BILATERAL LOW BACK PAIN WITH BILATERAL SCIATICA: Primary | ICD-10-CM

## 2018-01-31 DIAGNOSIS — M54.42 ACUTE BILATERAL LOW BACK PAIN WITH BILATERAL SCIATICA: Primary | ICD-10-CM

## 2018-01-31 DIAGNOSIS — M62.830 MUSCLE SPASM OF BACK: ICD-10-CM

## 2018-01-31 DIAGNOSIS — S33.5XXD LUMBAR SPRAIN, SUBSEQUENT ENCOUNTER: ICD-10-CM

## 2018-01-31 PROCEDURE — 3008F BODY MASS INDEX DOCD: CPT | Mod: S$GLB,,, | Performed by: PREVENTIVE MEDICINE

## 2018-01-31 PROCEDURE — 99213 OFFICE O/P EST LOW 20 MIN: CPT | Mod: S$GLB,,, | Performed by: PREVENTIVE MEDICINE

## 2018-01-31 NOTE — LETTER
[facility]  [address], [address]  [place] [address]  Phone: [phone]  Fax: [phone]    Pt Name: Theresa Chambers  Injury Date: 06/09/2017   Employee ID: [id number] Date : 01/31/2018   Company: [employer]            Appointment Time: 10:45 AM Arrived: 11:00 AM CST   Physician: Kobe Salgado MD Check out: 12:10 PM       Office Treatment: Theresa was seen today for back pain.    Diagnoses and all orders for this visit:    Acute bilateral low back pain with bilateral sciatica    Muscle spasm of back    Lumbar sprain, subsequent encounter       Patient Instructions: Daily home exercises/warm soaks, Begin Physical Therapy (Continue Etodolac and Flexeril at night. 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

## 2018-01-31 NOTE — PROGRESS NOTES
Subjective:       Patient ID: Theresa Chambers is a 37 y.o. female.    Chief Complaint: Back Pain    F/u for low back pain (doi 6/9/2017) Pt reports constant pain. She only takes her muscle relaxer and pain meds at bedtime. Patient begins PT today. Pt is requesting toradol injection. She states that it gave her significant relief the rest of the day on last ov. Current pain 7/10.ajd      Back Pain   The current episode started more than 1 month ago. The problem occurs constantly. The problem is unchanged. The pain is present in the lumbar spine. The quality of the pain is described as aching. The pain does not radiate. The pain is at a severity of 7/10. The pain is moderate. The pain is worse during the day. She has tried muscle relaxant and analgesics for the symptoms. The treatment provided moderate relief.     Review of Systems   Musculoskeletal: Positive for back pain and myalgias.   All other systems reviewed and are negative.      Objective:      Physical Exam   Constitutional: She appears well-developed and well-nourished.   HENT:   Head: Normocephalic and atraumatic.   Eyes: EOM are normal. Pupils are equal, round, and reactive to light.   Neck: Normal range of motion. Neck supple.   Cardiovascular: Normal rate and regular rhythm.    Pulmonary/Chest: Effort normal and breath sounds normal.   Musculoskeletal:        Lumbar back: She exhibits decreased range of motion, tenderness and pain. She exhibits no bony tenderness, no swelling, no edema, no deformity, no laceration, no spasm and normal pulse.        Back:    Pain across low back radiating to both hips with palpation and range of motion. Pain with flexion to 45, extension to 10 and lateral bending to 25 degrees. No motor or sensory deficits.   Neurological: She is alert.   No focal neurologic deficits   Skin: Skin is warm.   Psychiatric: She has a normal mood and affect.   Nursing note and vitals reviewed.      Assessment:       1. Acute bilateral low  back pain with bilateral sciatica    2. Muscle spasm of back    3. Lumbar sprain, subsequent encounter        Plan:            Patient Instructions: Daily home exercises/warm soaks, Begin Physical Therapy (Continue Etodolac and Flexeril at night. 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

## 2018-02-07 ENCOUNTER — OFFICE VISIT (OUTPATIENT)
Dept: URGENT CARE | Facility: CLINIC | Age: 38
End: 2018-02-07
Payer: OTHER MISCELLANEOUS

## 2018-02-07 DIAGNOSIS — M54.41 ACUTE BILATERAL LOW BACK PAIN WITH BILATERAL SCIATICA: Primary | ICD-10-CM

## 2018-02-07 DIAGNOSIS — M54.42 ACUTE BILATERAL LOW BACK PAIN WITH BILATERAL SCIATICA: Primary | ICD-10-CM

## 2018-02-07 DIAGNOSIS — S33.5XXD LUMBAR SPRAIN, SUBSEQUENT ENCOUNTER: ICD-10-CM

## 2018-02-07 DIAGNOSIS — M62.830 MUSCLE SPASM OF BACK: ICD-10-CM

## 2018-02-07 PROCEDURE — 3008F BODY MASS INDEX DOCD: CPT | Mod: S$GLB,,, | Performed by: PREVENTIVE MEDICINE

## 2018-02-07 PROCEDURE — 99213 OFFICE O/P EST LOW 20 MIN: CPT | Mod: S$GLB,,, | Performed by: PREVENTIVE MEDICINE

## 2018-02-07 NOTE — PROGRESS NOTES
Subjective:       Patient ID: Theresa Chambers is a 37 y.o. female.    Chief Complaint: Back Pain    F/u for low back pain (doi 6/9/2017) Pt states she just left therapy, which is helping, so her pain level is elevated 10/10. Once she returns home to relax her pain level drops to 4/10. She continues to take her muscle relaxer and pain meds at night. She has began using biofreeze, continues homes exercises and uses heating pad all with moderate relief. ajd      Back Pain   The current episode started more than 1 month ago. The problem occurs constantly. The problem has been waxing and waning since onset. The pain is present in the lumbar spine. The quality of the pain is described as aching. The pain does not radiate. The pain is severe. Pertinent negatives include no abdominal pain, bladder incontinence, bowel incontinence, dysuria or numbness. She has tried analgesics, muscle relaxant, home exercises, heat and ice for the symptoms. The treatment provided moderate relief.     Review of Systems   Constitution: Negative for malaise/fatigue.   Skin: Negative for color change and rash.   Musculoskeletal: Positive for back pain and myalgias. Negative for muscle cramps, muscle weakness and stiffness.   Gastrointestinal: Negative for abdominal pain and bowel incontinence.   Genitourinary: Negative for bladder incontinence, dysuria, hematuria and urgency.   Neurological: Negative for disturbances in coordination and numbness.   All other systems reviewed and are negative.      Objective:      Physical Exam   Constitutional: She appears well-developed and well-nourished.   HENT:   Head: Normocephalic and atraumatic.   Eyes: EOM are normal. Pupils are equal, round, and reactive to light.   Neck: Normal range of motion. Neck supple.   Cardiovascular: Normal rate and regular rhythm.    Pulmonary/Chest: Effort normal and breath sounds normal.   Musculoskeletal:        Lumbar back: She exhibits decreased range of motion,  tenderness and pain. She exhibits no bony tenderness, no swelling, no edema, no deformity, no laceration, no spasm and normal pulse.        Back:    Pain across low back radiating to both hips with palpation and range of motion. Pain with flexion to 45, extension to 25 and lateral bending to 45 degrees. No motor or sensory deficits.   Neurological: She is alert.   No focal neurologic deficits   Skin: Skin is warm.   Psychiatric: She has a normal mood and affect.   Nursing note and vitals reviewed.      Assessment:       1. Acute bilateral low back pain with bilateral sciatica    2. Muscle spasm of back    3. Lumbar sprain, subsequent encounter        Plan:            Patient Instructions: Daily home exercises/warm soaks, Continue Physical Therapy (Continue Etodolac, Flexeril and Hydrocodone as needed for pain.)   Restrictions: No lifting/pushing/pulling more than 10 lbs (Avoid driving company vehicles while taking medication.)  Follow-up in about 12 days (around 2/19/2018).

## 2018-02-07 NOTE — LETTER
[facility] [place]  [address], [address]  [place] [address]  Phone: [phone]  Fax: [phone]    Pt Name: Theresa Chambers  Injury Date: 06/09/2017   Employee ID: [id number] Date: 02/07/2018   Company:  [employer]            Appointment Time: 10:00 AM Arrived: 10:11 AM CST   Physician: Kobe Salgado MD Time out: 12:02 PM       Office Treatment: Theresa was seen today for back pain.    Diagnoses and all orders for this visit:    Acute bilateral low back pain with bilateral sciatica    Muscle spasm of back    Lumbar sprain, subsequent encounter       Patient Instructions: Daily home exercises/warm soaks, Continue Physical Therapy (Continue Etodolac, Flexeril and Hydrocodone as needed for pain.)      Restrictions: LIGHT DUTY  No lifting/pushing/pulling more than 10 lbs   (Avoid driving company vehicles while taking medication.)       Return Appointment: 2/19/2018 at 11:00 AM

## 2018-02-19 ENCOUNTER — OFFICE VISIT (OUTPATIENT)
Dept: URGENT CARE | Facility: CLINIC | Age: 38
End: 2018-02-19
Payer: OTHER MISCELLANEOUS

## 2018-02-19 DIAGNOSIS — S33.5XXD LUMBAR SPRAIN, SUBSEQUENT ENCOUNTER: ICD-10-CM

## 2018-02-19 DIAGNOSIS — M54.41 ACUTE BILATERAL LOW BACK PAIN WITH BILATERAL SCIATICA: Primary | ICD-10-CM

## 2018-02-19 DIAGNOSIS — M62.830 MUSCLE SPASM OF BACK: ICD-10-CM

## 2018-02-19 DIAGNOSIS — M54.42 ACUTE BILATERAL LOW BACK PAIN WITH BILATERAL SCIATICA: Primary | ICD-10-CM

## 2018-02-19 PROCEDURE — 3008F BODY MASS INDEX DOCD: CPT | Mod: S$GLB,,, | Performed by: PREVENTIVE MEDICINE

## 2018-02-19 PROCEDURE — 99213 OFFICE O/P EST LOW 20 MIN: CPT | Mod: S$GLB,,, | Performed by: PREVENTIVE MEDICINE

## 2018-02-19 RX ORDER — CYCLOBENZAPRINE HCL 10 MG
10 TABLET ORAL 3 TIMES DAILY PRN
Qty: 20 TABLET | Refills: 0 | Status: SHIPPED | OUTPATIENT
Start: 2018-02-19 | End: 2018-03-01

## 2018-02-19 RX ORDER — HYDROCODONE BITARTRATE AND ACETAMINOPHEN 5; 325 MG/1; MG/1
1 TABLET ORAL EVERY 6 HOURS PRN
Qty: 30 TABLET | Refills: 0 | Status: SHIPPED | OUTPATIENT
Start: 2018-02-19

## 2018-02-19 RX ORDER — ETODOLAC 400 MG/1
400 TABLET, FILM COATED ORAL 2 TIMES DAILY
Qty: 30 TABLET | Refills: 0 | Status: SHIPPED | OUTPATIENT
Start: 2018-02-19 | End: 2019-02-19

## 2018-02-19 NOTE — PROGRESS NOTES
Subjective:       Patient ID: Theresa Chambers is a 37 y.o. female.    Chief Complaint: Back Pain    F/u for low back pain (doi 6/9/2017) Pt c/o intermittent LBP worse with prolonged sitting and standing. Pt completed her 1st round of physical therapy today. She states that therapy does help. Pt is out of Norco and Flexeril and is requesting refill. Current pain 6/10 on pain scale. ajd       Back Pain   The current episode started more than 1 month ago. The problem occurs intermittently. The problem is unchanged. The pain is present in the lumbar spine. The quality of the pain is described as aching. The pain does not radiate. The pain is at a severity of 6/10. The pain is moderate. The symptoms are aggravated by sitting and standing. Pertinent negatives include no abdominal pain, bladder incontinence, bowel incontinence, dysuria or numbness. She has tried muscle relaxant and analgesics for the symptoms. The treatment provided mild relief.     Review of Systems   Constitution: Negative for malaise/fatigue.   Skin: Negative for color change and rash.   Musculoskeletal: Positive for back pain and myalgias. Negative for muscle cramps, muscle weakness and stiffness.   Gastrointestinal: Negative for abdominal pain and bowel incontinence.   Genitourinary: Negative for bladder incontinence, dysuria, hematuria and urgency.   Neurological: Negative for disturbances in coordination and numbness.   All other systems reviewed and are negative.      Objective:      Physical Exam   Constitutional: She appears well-developed and well-nourished.   HENT:   Head: Normocephalic and atraumatic.   Eyes: EOM are normal. Pupils are equal, round, and reactive to light.   Neck: Normal range of motion. Neck supple.   Cardiovascular: Normal rate and regular rhythm.    Pulmonary/Chest: Effort normal and breath sounds normal.   Musculoskeletal:        Lumbar back: She exhibits decreased range of motion, tenderness and pain. She exhibits no bony  tenderness, no swelling, no edema, no deformity, no laceration, no spasm and normal pulse.        Back:    Pain across low back radiating to both hips with palpation and range of motion. Pain with flexion to 45, extension to 25 and lateral bending to 45 degrees. No motor or sensory deficits.   Neurological: She is alert.   No focal neurologic deficits   Skin: Skin is warm.   Psychiatric: She has a normal mood and affect.   Nursing note and vitals reviewed.      Assessment:       1. Acute bilateral low back pain with bilateral sciatica    2. Muscle spasm of back    3. Lumbar sprain, subsequent encounter        Plan:           Medications Ordered This Encounter      cyclobenzaprine (FLEXERIL) 10 MG tablet          Sig: Take 1 tablet (10 mg total) by mouth 3 (three) times daily as needed for Muscle spasms.          Dispense:  20 tablet          Refill:  0      etodolac (LODINE) 400 MG tablet          Sig: Take 1 tablet (400 mg total) by mouth 2 (two) times daily.          Dispense:  30 tablet          Refill:  0      hydrocodone-acetaminophen 5-325mg (NORCO) 5-325 mg per tablet          Sig: Take 1 tablet by mouth every 6 (six) hours as needed for Pain.          Dispense:  30 tablet          Refill:  0  Patient Instructions: Daily home exercises/warm soaks, Continue Physical Therapy   Restrictions: No lifting/pushing/pulling more than 10 lbs (Avoid driving company vehicles)  Follow-up in about 2 weeks (around 3/5/2018).

## 2018-02-19 NOTE — LETTER
[facility] [place]  [address], [address]  [place] [address]  Phone: [phone]  Fax: [phone]    Pt Name: Theresa Chambers  Injury Date: 06/09/2017   Employee ID: [id number] Date  02/19/2018   Company: [employer]            Appointment Time: 11:00 AM Arrived: 11:02 AM CST   Physician: Kobe Salgado MD Timr out: 1:00 PM     Office treatment: Theresa was seen today for back pain.    Diagnoses and all orders for this visit:    Acute bilateral low back pain with bilateral sciatica  -     etodolac (LODINE) 400 MG tablet; Take 1 tablet (400 mg total) by mouth 2 (two) times daily.  Muscle spasm of back  -     cyclobenzaprine (FLEXERIL) 10 MG tablet; Take 1 tablet (10 mg total) by mouth 3 (three) times daily as needed for Muscle spasms.  Lumbar sprain, subsequent encounter  -     hydrocodone-acetaminophen 5-325mg (NORCO) 5-325 mg per tablet; Take 1 tablet by mouth every 6 (six) hours as needed for Pain.  -     cyclobenzaprine (FLEXERIL) 10 MG tablet; Take 1 tablet (10 mg total) by mouth 3 (three) times daily as needed for Muscle spasms.     Patient Instructions: Daily home exercises/warm soaks, Continue Physical Therapy    Restrictions: LIGHT DUTY   No lifting/pushing/pulling more than 10 lbs   Avoid driving company vehicles       Return Appointment: 3/5/2018 at 11:30 AM

## 2018-03-12 ENCOUNTER — TELEPHONE (OUTPATIENT)
Dept: URGENT CARE | Facility: CLINIC | Age: 38
End: 2018-03-12

## 2018-03-15 ENCOUNTER — TELEPHONE (OUTPATIENT)
Dept: OCCUPATIONAL MEDICINE | Facility: CLINIC | Age: 38
End: 2018-03-15

## 2018-07-19 ENCOUNTER — HOSPITAL ENCOUNTER (EMERGENCY)
Facility: HOSPITAL | Age: 38
Discharge: HOME | End: 2018-07-19
Attending: EMERGENCY MEDICINE
Payer: MEDICAID

## 2018-07-19 VITALS
HEIGHT: 65 IN | WEIGHT: 230 LBS | RESPIRATION RATE: 18 BRPM | DIASTOLIC BLOOD PRESSURE: 78 MMHG | OXYGEN SATURATION: 100 % | SYSTOLIC BLOOD PRESSURE: 121 MMHG | BODY MASS INDEX: 38.32 KG/M2 | HEART RATE: 92 BPM | TEMPERATURE: 99 F
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
## 2018-07-19 DIAGNOSIS — M25.511 ACUTE PAIN OF RIGHT SHOULDER: ICD-10-CM

## 2018-07-19 DIAGNOSIS — S13.9XXA CERVICAL SPRAIN, INITIAL ENCOUNTER: Primary | ICD-10-CM
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
## 2018-07-19 LAB
B-HCG UR QL: NEGATIVE
CTP QC/QA: YES

## 2018-07-19 PROCEDURE — 63600175 PHARM REV CODE 636 W HCPCS: Performed by: NURSE PRACTITIONER

## 2018-07-19 PROCEDURE — 96372 THER/PROPH/DIAG INJ SC/IM: CPT

## 2018-07-19 PROCEDURE — 81025 URINE PREGNANCY TEST: CPT | Performed by: EMERGENCY MEDICINE

## 2018-07-19 PROCEDURE — 99284 EMERGENCY DEPT VISIT MOD MDM: CPT | Mod: 25

## 2018-07-19 RX ORDER — NAPROXEN 500 MG/1
500 TABLET ORAL 2 TIMES DAILY PRN
Qty: 20 TABLET | Refills: 0 | Status: SHIPPED | OUTPATIENT
Start: 2018-07-19

## 2018-07-19 RX ORDER — KETOROLAC TROMETHAMINE 30 MG/ML
30 INJECTION, SOLUTION INTRAMUSCULAR; INTRAVENOUS
Status: COMPLETED | OUTPATIENT
Start: 2018-07-19 | End: 2018-07-19

## 2018-07-19 RX ORDER — ORPHENADRINE CITRATE 30 MG/ML
60 INJECTION INTRAMUSCULAR; INTRAVENOUS
Status: COMPLETED | OUTPATIENT
Start: 2018-07-19 | End: 2018-07-19

## 2018-07-19 RX ORDER — METHOCARBAMOL 500 MG/1
1000 TABLET, FILM COATED ORAL 4 TIMES DAILY PRN
Qty: 40 TABLET | Refills: 0 | Status: SHIPPED | OUTPATIENT
Start: 2018-07-19 | End: 2018-07-24

## 2018-07-19 RX ADMIN — KETOROLAC TROMETHAMINE 30 MG: 30 INJECTION, SOLUTION INTRAMUSCULAR; INTRAVENOUS at 05:07

## 2018-07-19 RX ADMIN — ORPHENADRINE CITRATE 60 MG: 30 INJECTION INTRAMUSCULAR; INTRAVENOUS at 05:07
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
## 2018-09-14 ENCOUNTER — OFFICE VISIT (OUTPATIENT)
Dept: OBSTETRICS AND GYNECOLOGY | Facility: CLINIC | Age: 38
End: 2018-09-14
Payer: MEDICAID

## 2018-09-14 VITALS — WEIGHT: 238 LBS | BODY MASS INDEX: 39.65 KG/M2 | HEIGHT: 65 IN

## 2018-09-14 DIAGNOSIS — Z11.3 SCREEN FOR STD (SEXUALLY TRANSMITTED DISEASE): ICD-10-CM

## 2018-09-14 DIAGNOSIS — Z01.419 ENCOUNTER FOR GYNECOLOGICAL EXAMINATION WITHOUT ABNORMAL FINDING: Primary | ICD-10-CM

## 2018-09-14 DIAGNOSIS — R30.0 DYSURIA: ICD-10-CM

## 2018-09-14 PROCEDURE — 99999 PR PBB SHADOW E&M-EST. PATIENT-LVL III: CPT | Mod: PBBFAC,,, | Performed by: OBSTETRICS & GYNECOLOGY

## 2018-09-14 PROCEDURE — 99395 PREV VISIT EST AGE 18-39: CPT | Mod: S$PBB,,, | Performed by: OBSTETRICS & GYNECOLOGY

## 2018-09-14 PROCEDURE — 87086 URINE CULTURE/COLONY COUNT: CPT

## 2018-09-14 PROCEDURE — 87660 TRICHOMONAS VAGIN DIR PROBE: CPT

## 2018-09-14 PROCEDURE — 87491 CHLMYD TRACH DNA AMP PROBE: CPT

## 2018-09-14 PROCEDURE — 99213 OFFICE O/P EST LOW 20 MIN: CPT | Mod: PBBFAC | Performed by: OBSTETRICS & GYNECOLOGY

## 2018-09-14 NOTE — PROGRESS NOTES
Subjective:       Patient ID: Theresa Chambers is a 38 y.o. female.    Chief Complaint:  Gynecologic Exam      History of Present Illness  HPI  Annual Exam-Premenopausal  Patient presents for annual exam. The patient has no complaints today. The patient is sexually active. GYN screening history: last pap: approximate date 2017 and was normal. The patient wears seatbelts: yes. The patient participates in regular exercise: yes. Has the patient ever been transfused or tattooed?: yes. The patient reports that there is not domestic violence in her life.    Pt d/o mild burning with urination that is intermittent.                    GYN & OB History  No LMP recorded.   Date of Last Pap: 2017    OB History    Para Term  AB Living   4 4           SAB TAB Ectopic Multiple Live Births         1        # Outcome Date GA Lbr Ryan/2nd Weight Sex Delivery Anes PTL Lv   4A Para 05     CS-LTranv      4B Para 05     CS-LTranv      3 Para 00     Vag-Spont      2 Para 00     Vag-Spont      1 Para                   Review of Systems  Review of Systems   Constitutional: Negative.    Respiratory: Negative.    Cardiovascular: Negative.    Gastrointestinal: Negative.    Endocrine: Negative.    Genitourinary: Negative.    Musculoskeletal: Negative.    Skin:  Negative.   Hematological: Negative.    Psychiatric/Behavioral: Negative.    Breast: negative.            Objective:    Physical Exam:   Constitutional: She is oriented to person, place, and time. She appears well-developed and well-nourished. No distress.    HENT:   Head: Normocephalic and atraumatic.     Neck: Normal range of motion.    Cardiovascular: Normal rate.     Pulmonary/Chest: Effort normal. Right breast exhibits no inverted nipple, no mass, no nipple discharge, no skin change, no tenderness, presence, no bleeding and no swelling. Left breast exhibits no inverted nipple, no mass, no nipple discharge, no skin change, no tenderness,  presence, no bleeding and no swelling.        Abdominal: Soft. She exhibits no distension. There is no tenderness.     Genitourinary: Vagina normal and uterus normal. Pelvic exam was performed with patient supine. There is no rash, tenderness, lesion or injury on the right labia. There is no rash, tenderness, lesion or injury on the left labia. Cervix is normal. Right adnexum displays no mass, no tenderness and no fullness. Left adnexum displays no mass, no tenderness and no fullness. Labial bartholins normal.          Musculoskeletal: Normal range of motion and moves all extremeties. She exhibits no tenderness.       Neurological: She is alert and oriented to person, place, and time. No cranial nerve deficit. Coordination normal.    Skin: She is not diaphoretic.    Psychiatric: She has a normal mood and affect. Her behavior is normal. Judgment and thought content normal.          Assessment:        1. Encounter for gynecological examination without abnormal finding    2. Dysuria    3. Screen for STD (sexually transmitted disease)               Plan:      1.  Pap smear not indicated at this time, routine gyn exam  2.  UC, GC/CT and AFFIRM collected - treatment based on results

## 2018-09-16 LAB
BACTERIA UR CULT: NORMAL
CANDIDA RRNA VAG QL PROBE: NEGATIVE
G VAGINALIS RRNA GENITAL QL PROBE: POSITIVE
T VAGINALIS RRNA GENITAL QL PROBE: NEGATIVE

## 2018-09-17 ENCOUNTER — TELEPHONE (OUTPATIENT)
Dept: OBSTETRICS AND GYNECOLOGY | Facility: CLINIC | Age: 38
End: 2018-09-17

## 2018-09-17 DIAGNOSIS — N76.0 BACTERIAL VAGINOSIS: Primary | ICD-10-CM

## 2018-09-17 DIAGNOSIS — B96.89 BACTERIAL VAGINOSIS: Primary | ICD-10-CM

## 2018-09-17 LAB
C TRACH DNA SPEC QL NAA+PROBE: NOT DETECTED
N GONORRHOEA DNA SPEC QL NAA+PROBE: NOT DETECTED

## 2018-09-17 RX ORDER — METRONIDAZOLE 500 MG/1
500 TABLET ORAL EVERY 12 HOURS
Qty: 14 TABLET | Refills: 0 | Status: SHIPPED | OUTPATIENT
Start: 2018-09-17 | End: 2018-10-01

## 2020-06-19 ENCOUNTER — OFFICE VISIT (OUTPATIENT)
Dept: OBSTETRICS AND GYNECOLOGY | Facility: CLINIC | Age: 40
End: 2020-06-19
Payer: MEDICAID

## 2020-06-19 VITALS
DIASTOLIC BLOOD PRESSURE: 92 MMHG | BODY MASS INDEX: 42.52 KG/M2 | HEIGHT: 63 IN | WEIGHT: 240 LBS | SYSTOLIC BLOOD PRESSURE: 141 MMHG

## 2020-06-19 DIAGNOSIS — N92.0 MENORRHAGIA WITH REGULAR CYCLE: Primary | ICD-10-CM

## 2020-06-19 DIAGNOSIS — Z11.3 SCREEN FOR STD (SEXUALLY TRANSMITTED DISEASE): ICD-10-CM

## 2020-06-19 DIAGNOSIS — Z01.419 GYNECOLOGIC EXAM NORMAL: ICD-10-CM

## 2020-06-19 PROCEDURE — 88175 CYTOPATH C/V AUTO FLUID REDO: CPT | Performed by: PATHOLOGY

## 2020-06-19 PROCEDURE — 99395 PR PREVENTIVE VISIT,EST,18-39: ICD-10-PCS | Mod: S$PBB,,, | Performed by: OBSTETRICS & GYNECOLOGY

## 2020-06-19 PROCEDURE — 99999 PR PBB SHADOW E&M-EST. PATIENT-LVL III: ICD-10-PCS | Mod: PBBFAC,,, | Performed by: OBSTETRICS & GYNECOLOGY

## 2020-06-19 PROCEDURE — 88141 CYTOPATH C/V INTERPRET: CPT | Mod: ,,, | Performed by: PATHOLOGY

## 2020-06-19 PROCEDURE — 87491 CHLMYD TRACH DNA AMP PROBE: CPT

## 2020-06-19 PROCEDURE — 99395 PREV VISIT EST AGE 18-39: CPT | Mod: S$PBB,,, | Performed by: OBSTETRICS & GYNECOLOGY

## 2020-06-19 PROCEDURE — 99999 PR PBB SHADOW E&M-EST. PATIENT-LVL III: CPT | Mod: PBBFAC,,, | Performed by: OBSTETRICS & GYNECOLOGY

## 2020-06-19 PROCEDURE — 87624 HPV HI-RISK TYP POOLED RSLT: CPT

## 2020-06-19 PROCEDURE — 88141 PR  CYTOPATH CERV/VAG INTERPRET: ICD-10-PCS | Mod: ,,, | Performed by: PATHOLOGY

## 2020-06-19 PROCEDURE — 99213 OFFICE O/P EST LOW 20 MIN: CPT | Mod: PBBFAC | Performed by: OBSTETRICS & GYNECOLOGY

## 2020-06-19 RX ORDER — LEVONORGESTREL AND ETHINYL ESTRADIOL 0.15-0.03
1 KIT ORAL DAILY
Qty: 91 TABLET | Refills: 3 | Status: SHIPPED | OUTPATIENT
Start: 2020-06-19 | End: 2021-06-19

## 2020-06-23 LAB
C TRACH DNA SPEC QL NAA+PROBE: NOT DETECTED
N GONORRHOEA DNA SPEC QL NAA+PROBE: NOT DETECTED

## 2020-06-26 LAB
HPV HR 12 DNA SPEC QL NAA+PROBE: NEGATIVE
HPV16 AG SPEC QL: NEGATIVE
HPV18 DNA SPEC QL NAA+PROBE: NEGATIVE

## 2020-06-29 LAB
FINAL PATHOLOGIC DIAGNOSIS: ABNORMAL
Lab: ABNORMAL

## 2020-06-30 ENCOUNTER — TELEPHONE (OUTPATIENT)
Dept: OBSTETRICS AND GYNECOLOGY | Facility: CLINIC | Age: 40
End: 2020-06-30

## 2020-06-30 NOTE — TELEPHONE ENCOUNTER
Clarification given to Kristopher. No rx was called in for the pt.    ----- Message from Radha Peña sent at 6/30/2020  3:05 PM CDT -----  Type:  Pharmacy Calling to Clarify an RX    Name of Caller:  [facility]    Pharmacy Name: [facility] [address] [facility] [address] [phone] (Phone)  [phone] (Fax)        Prescription Name:  unknown     What do they need to clarify?  Patient called the pharmacy several times looking for a medication Dr. Bills was suppose to send it. So they call for . The only script they received was for the birthcontrol.     Can you be contacted via [facility]? Call     Best Call Back Number: [phone]

## 2020-06-30 NOTE — TELEPHONE ENCOUNTER
Spoke with the pt and advised her that  hadn't called any additional medications were called in. Pt tested positive for BV 6/19 but was symptomatic.       ----- Message from Sallie Garcia sent at 6/30/2020  2:55 PM CDT -----  Contact: Patient [phone]  Type: Patient Call Back    Who called: Patient    What is the request in detail: Pt states that Dr Wood was supposed to send a Rx to [facility], downstairs. States she's there and nothing was sent in. Please call pt in regards to  this.    Would the patient rather a call back or a response via [facility]? Call back    Best call back number: [phone]    Additional Information:Pt is downstairs waiting.

## 2020-07-01 ENCOUNTER — TELEPHONE (OUTPATIENT)
Dept: OBSTETRICS AND GYNECOLOGY | Facility: CLINIC | Age: 40
End: 2020-07-01

## 2020-07-01 DIAGNOSIS — N76.0 BV (BACTERIAL VAGINOSIS): Primary | ICD-10-CM

## 2020-07-01 DIAGNOSIS — B96.89 BV (BACTERIAL VAGINOSIS): Primary | ICD-10-CM

## 2020-07-01 RX ORDER — METRONIDAZOLE 500 MG/1
500 TABLET ORAL 2 TIMES DAILY
Qty: 14 TABLET | Refills: 0 | Status: SHIPPED | OUTPATIENT
Start: 2020-07-01 | End: 2020-07-08

## 2021-04-15 ENCOUNTER — PATIENT MESSAGE (OUTPATIENT)
Dept: RESEARCH | Facility: HOSPITAL | Age: 41
End: 2021-04-15

## 2024-10-01 ENCOUNTER — OFFICE VISIT (OUTPATIENT)
Dept: OBSTETRICS AND GYNECOLOGY | Facility: CLINIC | Age: 44
End: 2024-10-01
Payer: COMMERCIAL

## 2024-10-01 ENCOUNTER — LAB VISIT (OUTPATIENT)
Dept: LAB | Facility: HOSPITAL | Age: 44
End: 2024-10-01
Attending: OBSTETRICS & GYNECOLOGY
Payer: COMMERCIAL

## 2024-10-01 VITALS
BODY MASS INDEX: 37.49 KG/M2 | DIASTOLIC BLOOD PRESSURE: 80 MMHG | SYSTOLIC BLOOD PRESSURE: 140 MMHG | WEIGHT: 211.63 LBS

## 2024-10-01 DIAGNOSIS — Z12.31 BREAST CANCER SCREENING BY MAMMOGRAM: ICD-10-CM

## 2024-10-01 DIAGNOSIS — Z72.51 HIGH RISK HETEROSEXUAL BEHAVIOR: ICD-10-CM

## 2024-10-01 DIAGNOSIS — Z11.3 SCREEN FOR STD (SEXUALLY TRANSMITTED DISEASE): ICD-10-CM

## 2024-10-01 DIAGNOSIS — Z32.02 URINE PREGNANCY TEST NEGATIVE: ICD-10-CM

## 2024-10-01 DIAGNOSIS — Z01.419 GYNECOLOGIC EXAM NORMAL: Primary | ICD-10-CM

## 2024-10-01 LAB
HCG INTACT+B SERPL-ACNC: <1.2 MIU/ML
HCV AB SERPL QL IA: NORMAL
HIV 1+2 AB+HIV1 P24 AG SERPL QL IA: NORMAL
TREPONEMA PALLIDUM IGG+IGM AB [PRESENCE] IN SERUM OR PLASMA BY IMMUNOASSAY: NONREACTIVE

## 2024-10-01 PROCEDURE — 87491 CHLMYD TRACH DNA AMP PROBE: CPT | Performed by: OBSTETRICS & GYNECOLOGY

## 2024-10-01 PROCEDURE — 84702 CHORIONIC GONADOTROPIN TEST: CPT | Performed by: OBSTETRICS & GYNECOLOGY

## 2024-10-01 PROCEDURE — 3008F BODY MASS INDEX DOCD: CPT | Mod: CPTII,S$GLB,, | Performed by: OBSTETRICS & GYNECOLOGY

## 2024-10-01 PROCEDURE — 99999 PR PBB SHADOW E&M-EST. PATIENT-LVL III: CPT | Mod: PBBFAC,,, | Performed by: OBSTETRICS & GYNECOLOGY

## 2024-10-01 PROCEDURE — 3079F DIAST BP 80-89 MM HG: CPT | Mod: CPTII,S$GLB,, | Performed by: OBSTETRICS & GYNECOLOGY

## 2024-10-01 PROCEDURE — 86803 HEPATITIS C AB TEST: CPT | Performed by: OBSTETRICS & GYNECOLOGY

## 2024-10-01 PROCEDURE — 1159F MED LIST DOCD IN RCRD: CPT | Mod: CPTII,S$GLB,, | Performed by: OBSTETRICS & GYNECOLOGY

## 2024-10-01 PROCEDURE — 3077F SYST BP >= 140 MM HG: CPT | Mod: CPTII,S$GLB,, | Performed by: OBSTETRICS & GYNECOLOGY

## 2024-10-01 PROCEDURE — 99386 PREV VISIT NEW AGE 40-64: CPT | Mod: S$GLB,,, | Performed by: OBSTETRICS & GYNECOLOGY

## 2024-10-01 PROCEDURE — 87389 HIV-1 AG W/HIV-1&-2 AB AG IA: CPT | Performed by: OBSTETRICS & GYNECOLOGY

## 2024-10-01 PROCEDURE — 87591 N.GONORRHOEAE DNA AMP PROB: CPT | Performed by: OBSTETRICS & GYNECOLOGY

## 2024-10-01 PROCEDURE — 0352U VAGINOSIS SCREEN BY DNA PROBE: CPT | Performed by: OBSTETRICS & GYNECOLOGY

## 2024-10-01 PROCEDURE — 36415 COLL VENOUS BLD VENIPUNCTURE: CPT | Performed by: OBSTETRICS & GYNECOLOGY

## 2024-10-01 PROCEDURE — 86593 SYPHILIS TEST NON-TREP QUANT: CPT | Performed by: OBSTETRICS & GYNECOLOGY
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
## 2024-10-02 LAB
C TRACH DNA SPEC QL NAA+PROBE: NOT DETECTED
N GONORRHOEA DNA SPEC QL NAA+PROBE: NOT DETECTED

## 2024-10-03 ENCOUNTER — TELEPHONE (OUTPATIENT)
Dept: OBSTETRICS AND GYNECOLOGY | Facility: HOSPITAL | Age: 44
End: 2024-10-03
Payer: COMMERCIAL

## 2024-10-03 DIAGNOSIS — B96.89 BACTERIAL VAGINOSIS: Primary | ICD-10-CM

## 2024-10-03 DIAGNOSIS — N76.0 BACTERIAL VAGINOSIS: Primary | ICD-10-CM

## 2024-10-03 LAB
BACTERIAL VAGINOSIS DNA: POSITIVE
CANDIDA GLABRATA/KRUSEI: NOT DETECTED
CANDIDA RRNA VAG QL PROBE: NOT DETECTED
TRICHOMONAS VAGINALIS: NOT DETECTED

## 2024-10-03 RX ORDER — METRONIDAZOLE 500 MG/1
500 TABLET ORAL EVERY 12 HOURS
Qty: 14 TABLET | Refills: 0 | Status: SHIPPED | OUTPATIENT
Start: 2024-10-03 | End: 2024-10-10